# Patient Record
Sex: FEMALE | Race: WHITE | Employment: PART TIME | URBAN - METROPOLITAN AREA
[De-identification: names, ages, dates, MRNs, and addresses within clinical notes are randomized per-mention and may not be internally consistent; named-entity substitution may affect disease eponyms.]

---

## 2021-12-02 ENCOUNTER — HOSPITAL ENCOUNTER (EMERGENCY)
Age: 21
Discharge: HOME | End: 2021-12-02
Attending: EMERGENCY MEDICINE
Payer: COMMERCIAL

## 2021-12-02 VITALS
DIASTOLIC BLOOD PRESSURE: 86 MMHG | SYSTOLIC BLOOD PRESSURE: 121 MMHG | HEART RATE: 93 BPM | TEMPERATURE: 97.1 F | WEIGHT: 130 LBS | OXYGEN SATURATION: 98 % | BODY MASS INDEX: 23.04 KG/M2 | RESPIRATION RATE: 16 BRPM | HEIGHT: 63 IN

## 2021-12-02 DIAGNOSIS — R53.1 GENERALIZED WEAKNESS: Primary | ICD-10-CM

## 2021-12-02 LAB — SARS-COV-2, NAAT: NOT DETECTED

## 2021-12-02 PROCEDURE — 99284 EMERGENCY DEPT VISIT MOD MDM: CPT

## 2021-12-02 PROCEDURE — 87635 SARS-COV-2 COVID-19 AMP PRB: CPT

## 2021-12-02 ASSESSMENT — ENCOUNTER SYMPTOMS
COUGH: 1
SORE THROAT: 0
SHORTNESS OF BREATH: 0
BACK PAIN: 0
RHINORRHEA: 1
DIARRHEA: 0
VOMITING: 0
NAUSEA: 0
ABDOMINAL PAIN: 0

## 2021-12-02 NOTE — ED PROVIDER NOTES
[address] [facility] ED  eMERGENCYdEPARTMENT eNCOUnter      Pt Name: Josue Carrasquillo  MRN: [id number]  [place] 2000  Date of evaluation: 12/2/2021  Jose Cruz Camacho MD    CHIEF COMPLAINT           HPI  Josue Carrasquillo is a 22 y.o. female per chart review has no pmh presents to the ED with weakness, rhinorrhea, cough. Mother notes she was exposed to someone with covid on Sunday. Pt notes gradual onset, mild, constant, diffuse weakness since Monday. +Rhinorrhea. +Cough. Pt denies fever, n/v, cp, sob, ab pain, dysuria, diarrhea. ROS  Review of Systems   Constitutional: Negative for activity change, chills and fever. HENT: Positive for rhinorrhea. Negative for ear pain and sore throat. Eyes: Negative for visual disturbance. Respiratory: Positive for cough. Negative for shortness of breath. Cardiovascular: Negative for chest pain, palpitations and leg swelling. Gastrointestinal: Negative for abdominal pain, diarrhea, nausea and vomiting. Genitourinary: Negative for dysuria. Musculoskeletal: Negative for back pain. Skin: Negative for rash. Neurological: Positive for weakness. Negative for dizziness. Except as noted above the remainder of the review of systems was reviewed and negative. PAST MEDICAL HISTORY   History reviewed. No pertinent past medical history. SURGICAL HISTORY     History reviewed. No pertinent surgical history. CURRENTMEDICATIONS       There are no discharge medications for this patient. ALLERGIES     Patient has no known allergies. FAMILY HISTORY     History reviewed. No pertinent family history.        SOCIAL HISTORY       Social History     Socioeconomic History    Marital status:      Spouse name: None    Number of children: None    Years of education: None    Highest education level: None   Occupational History    None   Tobacco Use    Smoking status: Never Smoker    Smokeless tobacco: Never Used   Substance and Sexual Activity    Alcohol use: Not Currently     Comment: socially    Drug use: Never    Sexual activity: None   Other Topics Concern    None   Social History Narrative    None     Social Determinants of Health     Financial Resource Strain:     Difficulty of Paying Living Expenses: Not on file   Food Insecurity:     Worried About Running Out of Food in the Last Year: Not on file    Heri of Food in the Last Year: Not on file   Transportation Needs:     Lack of Transportation (Medical): Not on file    Lack of Transportation (Non-Medical): Not on file   Physical Activity:     Days of Exercise per Week: Not on file    Minutes of Exercise per Session: Not on file   Stress:     Feeling of Stress : Not on file   Social Connections:     Frequency of Communication with Friends and Family: Not on file    Frequency of Social Gatherings with Friends and Family: Not on file    Attends Catholic Services: Not on file    Active Member of [address] [place] [address] [employer] or Organizations: Not on file    Attends Club or Organization Meetings: Not on file    Marital Status: Not on file   Intimate Partner Violence:     Fear of Current or Ex-Partner: Not on file    Emotionally Abused: Not on file    Physically Abused: Not on file    Sexually Abused: Not on file   Housing Stability:     Unable to Pay for Housing in the Last Year: Not on file    Number of [place] in the Last Year: Not on file    Unstable Housing in the Last Year: Not on file         PHYSICAL EXAM       ED Triage Vitals [12/02/21 1145]   BP Temp Temp Source Pulse Resp SpO2 Height Weight   121/86 97.1 °F (36.2 °C) Temporal 93 16 98 % 5' 3\" (1.6 m) 130 lb (59 kg)       Physical Exam  Vitals and nursing note reviewed. Constitutional:       Appearance: She is well-developed. HENT:      Head: Normocephalic.       Right Ear: External ear normal.      Left Ear: External ear normal.   Eyes:      Conjunctiva/sclera: Conjunctivae normal.      Pupils: Pupils are equal, round, and reactive to light. Cardiovascular:      Rate and Rhythm: Normal rate and regular rhythm. Heart sounds: Normal heart sounds. Pulmonary:      Effort: Pulmonary effort is normal.      Breath sounds: Normal breath sounds. Abdominal:      General: Bowel sounds are normal. There is no distension. Palpations: Abdomen is soft. Tenderness: There is no abdominal tenderness. Musculoskeletal:         General: Normal range of motion. Cervical back: Normal range of motion and neck supple. Skin:     General: Skin is warm and dry. Neurological:      Mental Status: She is alert and oriented to person, place, and time. Psychiatric:         Mood and Affect: Mood normal.           MDM  23 yo female presents to the ED with rhinorrhea, cough, weakness after exposure to covid. Pt is afebrile, hemodynamically stable. Covid test sent. Covid negative. Pt educated about the results. Pt given weakness warning signs and will f/u with pcp. Pt understands plan. FINAL IMPRESSION      1.  Generalized weakness          DISPOSITION/PLAN   DISPOSITION Decision To Discharge 12/02/2021 11:59:35 AM        DISCHARGE MEDICATIONS:  [unfilled]         Maura Weller MD(electronically signed)  Attending Emergency Physician            Maura Weller MD  12/02/21 [phone]

## 2023-04-28 ENCOUNTER — OFFICE VISIT (OUTPATIENT)
Dept: PRIMARY CARE | Facility: CLINIC | Age: 23
End: 2023-04-28
Payer: COMMERCIAL

## 2023-04-28 VITALS
WEIGHT: 126 LBS | RESPIRATION RATE: 14 BRPM | BODY MASS INDEX: 22.32 KG/M2 | TEMPERATURE: 97.7 F | HEIGHT: 63 IN | HEART RATE: 102 BPM | DIASTOLIC BLOOD PRESSURE: 83 MMHG | SYSTOLIC BLOOD PRESSURE: 122 MMHG

## 2023-04-28 DIAGNOSIS — R59.0 REACTIVE CERVICAL NODES: Primary | ICD-10-CM

## 2023-04-28 DIAGNOSIS — D23.9 DERMATOFIBROMA: ICD-10-CM

## 2023-04-28 DIAGNOSIS — Z11.3 ROUTINE SCREENING FOR STI (SEXUALLY TRANSMITTED INFECTION): ICD-10-CM

## 2023-04-28 DIAGNOSIS — N94.6 DYSMENORRHEA: ICD-10-CM

## 2023-04-28 PROCEDURE — 87591 N.GONORRHOEAE DNA AMP PROB: CPT

## 2023-04-28 PROCEDURE — 87491 CHLMYD TRACH DNA AMP PROBE: CPT

## 2023-04-28 PROCEDURE — 99213 OFFICE O/P EST LOW 20 MIN: CPT | Performed by: FAMILY MEDICINE

## 2023-04-28 PROCEDURE — 1036F TOBACCO NON-USER: CPT | Performed by: FAMILY MEDICINE

## 2023-04-28 RX ORDER — LEVONORGESTREL 52 MG/1
1 INTRAUTERINE DEVICE INTRAUTERINE ONCE
COMMUNITY
Start: 2021-07-28 | End: 2023-04-28 | Stop reason: SINTOL

## 2023-04-28 NOTE — PROGRESS NOTES
Subjective   Dotty Bojorquez is a 23 y.o. female who presents for Mass.  HPI  She reports a painless, firm lump on her left upper arm for almost one year and a second one on the right side of her neck that she noticed about one month ago.  Review of Systems  Visit Vitals  /83   Pulse 102   Temp 36.5 °C (97.7 °F)   Resp 14      Objective   Physical Exam  Constitutional:       Appearance: Normal appearance.   HENT:      Head: Normocephalic.   Pulmonary:      Effort: Pulmonary effort is normal.   Musculoskeletal:      Cervical back: Neck supple.      Right lower leg: No edema.      Left lower leg: No edema.   Skin:     General: Skin is warm and dry.      Comments: There is a small 2 mm rubbery nodule that is freely mobile in the right superior posterior lymph chain.    There is a firm fibrotic 4 mm subcutaneous nodule on the left outer upper arm.  It dimples slightly when pinched but has no pigment change or erythema or induration or fluctuance   Psychiatric:         Mood and Affect: Mood normal.         Assessment/Plan    Problem List Items Addressed This Visit    None  Visit Diagnoses       Reactive cervical nodes    -  Primary    Dermatofibroma        Relevant Orders    Referral to Dermatology    Dysmenorrhea        Routine screening for STI (sexually transmitted infection)        Relevant Orders    C. Trachomatis / N. Gonorrhoeae, Amplified Detection        The nodule on the right lateral neck is most certainly a's very small mobile reactive node.  I reassured her extensively that this is highly likely to resolve on its own over time and does not require any intervention.    The nodule on the left upper outer arm is fibrotic, in the dermis layer, and dimples when pinched making it likely a dermatofibroma.  Explained that this is a benign lesion but is not likely to resolve over time.  She would like to consider removal options and I think that dermatology would give her the best advice and cosmetic options  for this.    She is also requesting screening for STI which we will do.  I offered her blood and urine screening and she is opting for the urine screening today.    Since removing her IUD 3 months ago she has had frequent menstrual bleeding every 2 to 3 weeks.  I offered her options but she would not like to start back on birth control.  It is likely that this will spontaneously resolve over time.  She will follow-up with her gynecologist if not

## 2023-04-29 LAB
CHLAMYDIA TRACH., AMPLIFIED: NEGATIVE
N. GONORRHEA, AMPLIFIED: NEGATIVE

## 2023-10-24 ENCOUNTER — OFFICE VISIT (OUTPATIENT)
Dept: URGENT CARE | Facility: CLINIC | Age: 23
End: 2023-10-24
Payer: COMMERCIAL

## 2023-10-24 VITALS
BODY MASS INDEX: 23.03 KG/M2 | DIASTOLIC BLOOD PRESSURE: 89 MMHG | HEART RATE: 84 BPM | TEMPERATURE: 98.4 F | SYSTOLIC BLOOD PRESSURE: 137 MMHG | WEIGHT: 130 LBS | OXYGEN SATURATION: 99 % | RESPIRATION RATE: 18 BRPM

## 2023-10-24 DIAGNOSIS — L03.211 CELLULITIS OF FACE: Primary | ICD-10-CM

## 2023-10-24 PROCEDURE — 1036F TOBACCO NON-USER: CPT | Performed by: PHYSICIAN ASSISTANT

## 2023-10-24 PROCEDURE — 99203 OFFICE O/P NEW LOW 30 MIN: CPT | Performed by: PHYSICIAN ASSISTANT

## 2023-10-24 RX ORDER — CLINDAMYCIN HYDROCHLORIDE 300 MG/1
300 CAPSULE ORAL 3 TIMES DAILY
Qty: 30 CAPSULE | Refills: 0 | Status: SHIPPED | OUTPATIENT
Start: 2023-10-24 | End: 2023-11-03

## 2023-10-24 ASSESSMENT — ENCOUNTER SYMPTOMS: WOUND: 1

## 2023-10-24 ASSESSMENT — PAIN SCALES - GENERAL: PAINLEVEL: 9
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
## 2024-01-22 ENCOUNTER — TELEPHONE (OUTPATIENT)
Dept: OBSTETRICS AND GYNECOLOGY | Facility: CLINIC | Age: 24
End: 2024-01-22

## 2024-01-24 ENCOUNTER — APPOINTMENT (OUTPATIENT)
Dept: OBSTETRICS AND GYNECOLOGY | Facility: CLINIC | Age: 24
End: 2024-01-24
Payer: COMMERCIAL

## 2024-01-24 ENCOUNTER — INITIAL PRENATAL (OUTPATIENT)
Dept: OBSTETRICS AND GYNECOLOGY | Facility: CLINIC | Age: 24
End: 2024-01-24
Payer: COMMERCIAL

## 2024-01-24 VITALS — WEIGHT: 132.5 LBS | DIASTOLIC BLOOD PRESSURE: 76 MMHG | BODY MASS INDEX: 23.47 KG/M2 | SYSTOLIC BLOOD PRESSURE: 108 MMHG

## 2024-01-24 DIAGNOSIS — O99.340 DEPRESSION AFFECTING PREGNANCY (HHS-HCC): ICD-10-CM

## 2024-01-24 DIAGNOSIS — Z98.82 BREAST IMPLANT STATUS: ICD-10-CM

## 2024-01-24 DIAGNOSIS — Z34.91 PRENATAL CARE IN FIRST TRIMESTER (HHS-HCC): Primary | ICD-10-CM

## 2024-01-24 DIAGNOSIS — O21.9 NAUSEA AND VOMITING IN PREGNANCY PRIOR TO 22 WEEKS GESTATION (HHS-HCC): ICD-10-CM

## 2024-01-24 DIAGNOSIS — R22.32 LUMP OF SKIN OF LEFT UPPER EXTREMITY: ICD-10-CM

## 2024-01-24 DIAGNOSIS — F32.A DEPRESSION AFFECTING PREGNANCY (HHS-HCC): ICD-10-CM

## 2024-01-24 PROBLEM — L03.211 CELLULITIS OF FACE: Status: RESOLVED | Noted: 2023-10-24 | Resolved: 2024-01-24

## 2024-01-24 PROBLEM — Z34.80 SUPERVISION OF OTHER NORMAL PREGNANCY, ANTEPARTUM (HHS-HCC): Status: ACTIVE | Noted: 2024-01-24

## 2024-01-24 LAB — PREGNANCY TEST URINE, POC: POSITIVE

## 2024-01-24 PROCEDURE — 87800 DETECT AGNT MULT DNA DIREC: CPT

## 2024-01-24 PROCEDURE — 87086 URINE CULTURE/COLONY COUNT: CPT

## 2024-01-24 PROCEDURE — H1000 PRENATAL CARE ATRISK ASSESSM: HCPCS

## 2024-01-24 PROCEDURE — 81025 URINE PREGNANCY TEST: CPT

## 2024-01-24 PROCEDURE — 99214 OFFICE O/P EST MOD 30 MIN: CPT

## 2024-01-24 RX ORDER — ONDANSETRON 4 MG/1
4 TABLET, FILM COATED ORAL EVERY 8 HOURS PRN
Qty: 30 TABLET | Refills: 2 | Status: SHIPPED | OUTPATIENT
Start: 2024-01-24

## 2024-01-24 RX ORDER — SERTRALINE HYDROCHLORIDE 50 MG/1
25 TABLET, FILM COATED ORAL DAILY
Qty: 90 TABLET | Refills: 1 | Status: SHIPPED | OUTPATIENT
Start: 2024-01-24 | End: 2024-02-22 | Stop reason: ALTCHOICE

## 2024-01-24 ASSESSMENT — PATIENT HEALTH QUESTIONNAIRE - PHQ9
10. IF YOU CHECKED OFF ANY PROBLEMS, HOW DIFFICULT HAVE THESE PROBLEMS MADE IT FOR YOU TO DO YOUR WORK, TAKE CARE OF THINGS AT HOME, OR GET ALONG WITH OTHER PEOPLE: VERY DIFFICULT
SUM OF ALL RESPONSES TO PHQ QUESTIONS 1-9: 19
3. TROUBLE FALLING OR STAYING ASLEEP OR SLEEPING TOO MUCH: MORE THAN HALF THE DAYS
9. THOUGHTS THAT YOU WOULD BE BETTER OFF DEAD, OR OF HURTING YOURSELF: NOT AT ALL
SUM OF ALL RESPONSES TO PHQ9 QUESTIONS 1 AND 2: 0
1. LITTLE INTEREST OR PLEASURE IN DOING THINGS: NEARLY EVERY DAY
4. FEELING TIRED OR HAVING LITTLE ENERGY: NEARLY EVERY DAY
2. FEELING DOWN, DEPRESSED OR HOPELESS: MORE THAN HALF THE DAYS
8. MOVING OR SPEAKING SO SLOWLY THAT OTHER PEOPLE COULD HAVE NOTICED. OR THE OPPOSITE, BEING SO FIGETY OR RESTLESS THAT YOU HAVE BEEN MOVING AROUND A LOT MORE THAN USUAL: MORE THAN HALF THE DAYS
1. LITTLE INTEREST OR PLEASURE IN DOING THINGS: NOT AT ALL
SUM OF ALL RESPONSES TO PHQ9 QUESTIONS 1 AND 2: 5
7. TROUBLE CONCENTRATING ON THINGS, SUCH AS READING THE NEWSPAPER OR WATCHING TELEVISION: MORE THAN HALF THE DAYS
2. FEELING DOWN, DEPRESSED OR HOPELESS: NOT AT ALL
5. POOR APPETITE OR OVEREATING: NEARLY EVERY DAY
6. FEELING BAD ABOUT YOURSELF - OR THAT YOU ARE A FAILURE OR HAVE LET YOURSELF OR YOUR FAMILY DOWN: MORE THAN HALF THE DAYS

## 2024-01-24 ASSESSMENT — EDINBURGH POSTNATAL DEPRESSION SCALE (EPDS)
THE THOUGHT OF HARMING MYSELF HAS OCCURRED TO ME: NEVER
I HAVE BEEN ABLE TO LAUGH AND SEE THE FUNNY SIDE OF THINGS: NOT QUITE SO MUCH NOW
I HAVE FELT SCARED OR PANICKY FOR NO GOOD REASON: YES, SOMETIMES
I HAVE BLAMED MYSELF UNNECESSARILY WHEN THINGS WENT WRONG: NOT VERY OFTEN
THINGS HAVE BEEN GETTING ON TOP OF ME: YES, SOMETIMES I HAVEN'T BEEN COPING AS WELL AS USUAL
I HAVE BEEN SO UNHAPPY THAT I HAVE HAD DIFFICULTY SLEEPING: YES, SOMETIMES
I HAVE FELT SAD OR MISERABLE: YES, QUITE OFTEN
I HAVE BEEN ANXIOUS OR WORRIED FOR NO GOOD REASON: YES, SOMETIMES
TOTAL SCORE: 15
I HAVE BEEN SO UNHAPPY THAT I HAVE BEEN CRYING: ONLY OCCASIONALLY
I HAVE LOOKED FORWARD WITH ENJOYMENT TO THINGS: DEFINITELY LESS THAN I USED TO

## 2024-01-24 ASSESSMENT — ENCOUNTER SYMPTOMS
LOSS OF SENSATION IN FEET: 0
OCCASIONAL FEELINGS OF UNSTEADINESS: 0
DEPRESSION: 0

## 2024-01-24 NOTE — PROGRESS NOTES
"Dotty Bojorquez is a 23 y.o.  at 10w1d with a working estimated date of delivery of 2024, by Last Menstrual Period who presents for an initial prenatal visit. This pregnancy is planned.    Patient's last menstrual period was 2023.     Patient currently experiencing:  Nausea/vomiting; has tried may with no relief.  Insomnia; wants to take Melatonin supplement.  Requesting Zofran Rx for nausea.  Also having constipation; has not yet tried anything to relieve.   Advised use of Colace 100 mg BID.     Bleeding or cramping since LMP: no  Taking prenatal vitamin: Yes  Ultrasound completed this pregnancy: Yes - Patient states through outside resource at ~7 weeks.      OB History    Para Term  AB Living   4 1 1 0 2 1   SAB IAB Ectopic Multiple Live Births   1 1 0 0 1      # Outcome Date GA Lbr Jeff/2nd Weight Sex Delivery Anes PTL Lv   4 Current            3 Term 17 39w0d   F Vag-Spont EPI  FLIP   2 IAB            1 SAB              [place]  Depression Scale Total: 15  Prior pregnancy complications: Gestational diabetes, diet-controlled in prior pregnancy.    History of hypertension:  No  Preeclampsia Risk - ASA prophylaxis; pt does not meet criteria.     Past Medical History:   Diagnosis Date    Abnormal Pap smear of cervix     Chlamydia     Excessive and frequent menstruation with regular cycle 2022    Spotting    H/O breast augmentation     Personal history of other infectious and parasitic diseases 10/18/2019    History of viral infection    Seasonal allergies     Urogenital trichomoniasis         Last pap: 3/2022, [facility].  Hx Depression/Anxiety - Yes - EPDS 15 today; states that she had PPD after her daughter, though \"didn't tell anyone abut it.\"  Feels that her emotions are heightened lately.  Does not see therapist or counselor.  Denies any thoughts of self-harm or harming others.  Open to beginning anti-anxiety medication and seeing new therapist.  Referral " to [facility] placed in system; low dose of Zoloft sent to patient pharmacy, advised discontinuation if any thoughts of self-harm or harming others or worsening depression.      Past Surgical History:   Procedure Laterality Date    OTHER SURGICAL HISTORY  08/20/2019    Tonsillectomy with adenoidectomy    [place] BREAST AUGMENTATION WITH IMPLANT  Spring 2021      Patient states that she had breast augmentation in 2021 in [place].  States that the implant in the left breast adhered to the muscle, and the provider was supposed to repair it, though then lost her medical license, so she has not had this repaired yet.  Wondering if/how it would affect breastfeeding?  Also concerned for a lump when she squeezes the implant that her boyfriend felt.  Lactation resources provided to patient; will place breast health  referral/US.     Patient also concerned for a skin lump on her left bicep that she states has been present x 1 year.  Dermatology referral placed and patient advised to schedule.     Social History     Tobacco Use    Smoking status: Never    Smokeless tobacco: Never   Vaping Use    Vaping Use: Every day    Substances: Nicotine   Substance Use Topics    Alcohol use: Not Currently    Drug use: Never      Employment: Stays at home.    Routine PNC, patient appropriate for and desires midwifery service. Oriented to practice, including available collaboration and consulting with physicians.   Discussed routine OB labs, including serum STI/HIV, CBC, blood type and screen.    Pregravid BMI: 22.15  Expected Total Weight Gain: 11.5 kg (25 lb)-16 kg (35 lb)     Education provided r/t nutrition, folic acid supplementation, dietary guidelines, exercise, smoking, alcohol, caffeine, and drug use.      Current Outpatient Medications:     prenatal19-iron-folic-omega3 29-1-400 mg combo pack,tablet and cap,, Take by mouth., Disp: , Rfl:      Genetic Testing - The patient was counselled regarding prenatal genetic testing options and  was provided literature in the obstetric folder. First line screening options, including cfDNA and first trimester ultrasound for nuchal translucency, were discussed and offered.  Benefits, drawbacks, and limitations were explained respectively.  It was clearly stated that only diagnostic testing (amniocentesis) provides definitive information regarding a genetic diagnosis in the fetus. It was discussed with the patient that the false positive rates for NIPT is higher for women under 35 years of age. The patient was informed that the cost of NIPT ranges and may not be covered by insurance depending on patient's age and risk factors. Patient aware that gender testing is available at a fraction of the cost through [employer].  This patient has decided to pursue.    Warning s/s discussed and SAB precautions reviewed.    F/U in 4 weeks -- Review U/S, needs new OB labs/NIPT, PE and pap.  Did she schedule with [facility] Deutsch?  Is she Covid/flu vaccinated?     Zuleyma Franklin, OLIVIA-MONI

## 2024-01-25 LAB
BACTERIA UR CULT: NO GROWTH
C TRACH RRNA SPEC QL NAA+PROBE: NEGATIVE
N GONORRHOEA DNA SPEC QL PROBE+SIG AMP: NEGATIVE

## 2024-02-13 ENCOUNTER — HOSPITAL ENCOUNTER (OUTPATIENT)
Dept: RADIOLOGY | Facility: CLINIC | Age: 24
Discharge: HOME | End: 2024-02-13
Payer: COMMERCIAL

## 2024-02-13 DIAGNOSIS — Z34.91 PRENATAL CARE IN FIRST TRIMESTER (HHS-HCC): ICD-10-CM

## 2024-02-13 PROCEDURE — 76813 OB US NUCHAL MEAS 1 GEST: CPT | Performed by: MEDICAL GENETICS

## 2024-02-13 PROCEDURE — 76813 OB US NUCHAL MEAS 1 GEST: CPT

## 2024-02-22 ENCOUNTER — LAB (OUTPATIENT)
Dept: LAB | Facility: LAB | Age: 24
End: 2024-02-22
Payer: COMMERCIAL

## 2024-02-22 ENCOUNTER — ROUTINE PRENATAL (OUTPATIENT)
Dept: OBSTETRICS AND GYNECOLOGY | Facility: CLINIC | Age: 24
End: 2024-02-22
Payer: COMMERCIAL

## 2024-02-22 VITALS — SYSTOLIC BLOOD PRESSURE: 104 MMHG | WEIGHT: 134.8 LBS | DIASTOLIC BLOOD PRESSURE: 60 MMHG | BODY MASS INDEX: 23.88 KG/M2

## 2024-02-22 DIAGNOSIS — Z34.80 SUPERVISION OF OTHER NORMAL PREGNANCY, ANTEPARTUM (HHS-HCC): Primary | ICD-10-CM

## 2024-02-22 DIAGNOSIS — F32.A DEPRESSION AFFECTING PREGNANCY (HHS-HCC): ICD-10-CM

## 2024-02-22 DIAGNOSIS — Z34.91 PRENATAL CARE IN FIRST TRIMESTER (HHS-HCC): ICD-10-CM

## 2024-02-22 DIAGNOSIS — Z34.80 SUPERVISION OF OTHER NORMAL PREGNANCY, ANTEPARTUM (HHS-HCC): ICD-10-CM

## 2024-02-22 DIAGNOSIS — O99.340 DEPRESSION AFFECTING PREGNANCY (HHS-HCC): ICD-10-CM

## 2024-02-22 LAB
ABO GROUP (TYPE) IN BLOOD: NORMAL
ANTIBODY SCREEN: NORMAL
ERYTHROCYTE [DISTWIDTH] IN BLOOD BY AUTOMATED COUNT: 12.7 % (ref 11.5–14.5)
EST. AVERAGE GLUCOSE BLD GHB EST-MCNC: 80 MG/DL
HBA1C MFR BLD: 4.4 %
HBV SURFACE AG SERPL QL IA: NONREACTIVE
HCT VFR BLD AUTO: 39.4 % (ref 36–46)
HCV AB SER QL: NONREACTIVE
HGB BLD-MCNC: 13.5 G/DL (ref 12–16)
HIV 1+2 AB+HIV1 P24 AG SERPL QL IA: NONREACTIVE
MCH RBC QN AUTO: 30.8 PG (ref 26–34)
MCHC RBC AUTO-ENTMCNC: 34.3 G/DL (ref 32–36)
MCV RBC AUTO: 90 FL (ref 80–100)
NRBC BLD-RTO: 0 /100 WBCS (ref 0–0)
PLATELET # BLD AUTO: 256 X10*3/UL (ref 150–450)
RBC # BLD AUTO: 4.39 X10*6/UL (ref 4–5.2)
REFLEX ADDED, ANEMIA PANEL: NORMAL
RH FACTOR (ANTIGEN D): NORMAL
RUBV IGG SERPL IA-ACNC: 2.4 IA
RUBV IGG SERPL QL IA: POSITIVE
TREPONEMA PALLIDUM IGG+IGM AB [PRESENCE] IN SERUM OR PLASMA BY IMMUNOASSAY: NONREACTIVE
WBC # BLD AUTO: 11.8 X10*3/UL (ref 4.4–11.3)

## 2024-02-22 PROCEDURE — 85027 COMPLETE CBC AUTOMATED: CPT

## 2024-02-22 PROCEDURE — 86901 BLOOD TYPING SEROLOGIC RH(D): CPT

## 2024-02-22 PROCEDURE — 86317 IMMUNOASSAY INFECTIOUS AGENT: CPT

## 2024-02-22 PROCEDURE — 83036 HEMOGLOBIN GLYCOSYLATED A1C: CPT

## 2024-02-22 PROCEDURE — 86803 HEPATITIS C AB TEST: CPT

## 2024-02-22 PROCEDURE — 86900 BLOOD TYPING SEROLOGIC ABO: CPT

## 2024-02-22 PROCEDURE — 86850 RBC ANTIBODY SCREEN: CPT

## 2024-02-22 PROCEDURE — 86780 TREPONEMA PALLIDUM: CPT

## 2024-02-22 PROCEDURE — 99214 OFFICE O/P EST MOD 30 MIN: CPT

## 2024-02-22 PROCEDURE — 87389 HIV-1 AG W/HIV-1&-2 AB AG IA: CPT

## 2024-02-22 PROCEDURE — 36415 COLL VENOUS BLD VENIPUNCTURE: CPT

## 2024-02-22 PROCEDURE — 87340 HEPATITIS B SURFACE AG IA: CPT

## 2024-02-22 RX ORDER — SERTRALINE HYDROCHLORIDE 50 MG/1
50 TABLET, FILM COATED ORAL DAILY
Qty: 30 TABLET | Refills: 11 | Status: SHIPPED | OUTPATIENT
Start: 2024-02-22 | End: 2025-02-21

## 2024-02-22 NOTE — LETTER
To Whom It May Concern:    This letter is to certify that, Dotty Bojorquez is a patient here at [facility].  She is currently pregnant with an estimated due date of 08/20/2024    Safe antibiotic should be given if indicated including Penicillin, Cephalosporin and Erythromycin.  Tetracyclin and Sulfa drugs should NOT be given.  Local anesthesia without epinephrine is acceptable.  DO NOT use general anesthesia.  Always shield the abdomen for X-Rays.  Painkillers like Tylenol #3 are safe, but NSAIDS should be avoided.    Patients allergies: Doxycycline. Methylphenidate and Zonisamide    If you have any questions, issues or concerns, please contact the office at [phone].    Thank you.          Zuleyma Claudio CNM

## 2024-02-22 NOTE — PROGRESS NOTES
"Liudmila Bojorquez is a 24 y.o.  at 14w2d with a working estimated date of delivery of 2024, by Last Menstrual Period who presents for a routine prenatal visit.    Patient reports overall feeling well; no concerns or OB complaints.  Taking Zoloft 25 mg daily with little relief of symptoms, wants to increase dose.  Has not yet scheduled with [facility].  States that she has a voicemail to schedule with breast speciality, though she has not done so.  Has also not yet scheduled with dermatology.    States unsure on if she is going to schedule these appointments.    Strong recommendation and support for Covid vaccine discussed. Patient aware of increased rate of hospitalization, intubation, and death with Covid in pregnancy - Demonstrated safety of vaccination during pregnancy with no associated increases in GDM, hypertensive disorders, miscarriage/ labor nor fetal anomalies reviewed. Patient aware vaccination is recommended by ACNM, ACOG, SMFM, and CDC - She declines vaccination.    Declines flu vaccine.     Did sneakpeak, it's a boy!     Objective   Visit Vitals  /60   Wt 61.1 kg (134 lb 12.8 oz)   LMP 2023   BMI 23.88 kg/m²   OB Status Pregnant   Smoking Status Never   BSA 1.65 m²     Vitals:    24 1136   Weight: 61.1 kg (134 lb 12.8 oz)      Pregravid Weight/BMI - 56.7 kg (125 lb) / 22.15  Expected Total Weight Gain - 11.5 kg (25 lb)-16 kg (35 lb)  TW.445 kg (9 lb 12.8 oz)  Fundal height and FHR per prenatal flowsheet.    Assessment/Plan   OB labs today plus A1C (hx of GDM in prior preg) and NIPT.  Wants to defer pap until her PP visit.   Anatomy US is scheduled.   Rx for 50 mg Zoloft sent; advised to take daily, to discontinue if any thoughts of self-harm or harming others emerge.   Encouraged scheduling with [facility], and utilizing previous referrals for breast specialty and dermatology (see initial prenatal note --   \"Patient states that she had breast " "augmentation in 2021 in [place].  States that the implant in the left breast adhered to the muscle, and the provider was supposed to repair it, though then lost her medical license, so she has not had this repaired yet.  Wondering if/how it would affect breastfeeding?  Also concerned for a lump when she squeezes the implant that her boyfriend felt.  Lactation resources provided to patient; will place breast health  referral/US.      Patient also concerned for a skin lump on her left bicep that she states has been present x 1 year.  Dermatology referral placed and patient advised to schedule.\" )     Warning signs and symptoms reviewed; patient has emergency answering service phone line number and is aware of when to call.   Remainder of plan per problem list as below.     Patient reports understanding; all questions answered at this time.     Medical Problems       Problem List       Supervision of other normal pregnancy, antepartum    Overview Addendum 1/24/2024 11:55 AM by DAPHNEY Gallardo     Covid/flu vaccinated?  Hx of breast augmentation 2021 -- States that the implant in the left breast adhered to the muscle, and the provider was supposed to repair it, though then lost her medical license, so she has not had this repaired yet... Referral to breast surgery and specialty placed.          Depression affecting pregnancy    Overview Addendum 1/24/2024 11:00 AM by DAPHNEY Gallardo     EPDS 15 on 1/24/24.  Referral placed to [facility].  Zoloft Rx 25 mg sent to pharmacy 1/24/24.             F/U in 4 weeks; review new OB labs and NIPT.  Scheduled with [facility] Deutsch?    DAPHNEY Gallardo  "

## 2024-03-06 LAB — SCAN RESULT: NORMAL

## 2024-03-21 ENCOUNTER — ROUTINE PRENATAL (OUTPATIENT)
Dept: OBSTETRICS AND GYNECOLOGY | Facility: CLINIC | Age: 24
End: 2024-03-21
Payer: COMMERCIAL

## 2024-03-21 VITALS
WEIGHT: 138.13 LBS | SYSTOLIC BLOOD PRESSURE: 110 MMHG | BODY MASS INDEX: 24.47 KG/M2 | DIASTOLIC BLOOD PRESSURE: 80 MMHG

## 2024-03-21 DIAGNOSIS — F32.A DEPRESSION AFFECTING PREGNANCY (HHS-HCC): ICD-10-CM

## 2024-03-21 DIAGNOSIS — Z34.80 SUPERVISION OF OTHER NORMAL PREGNANCY, ANTEPARTUM (HHS-HCC): ICD-10-CM

## 2024-03-21 DIAGNOSIS — O99.340 DEPRESSION AFFECTING PREGNANCY (HHS-HCC): ICD-10-CM

## 2024-03-21 PROCEDURE — 99213 OFFICE O/P EST LOW 20 MIN: CPT

## 2024-03-21 NOTE — PROGRESS NOTES
Subjective   Dotty Bojorquez is a 24 y.o.  at 18w2d with a working estimated date of delivery of 2024, by Last Menstrual Period who presents for a routine prenatal visit.    Patient reports overall feeling well; no concerns or OB complaints.    Had abdominal cramping and diarrhea last week, feeling better.  Continues to have occasional, intermittent right sided abdominal cramping, though improving.    Taking Zoloft 50 mg, though states that she is forgetful and does not always remember to take it -- Thinks that if she remembers to take it daily, she would notice more of an improvement.  Denies any thoughts of self-harm or harming others.  Did not yet schedule with [facility] -- States that she didn't know if she had an office location closer to her house, though does plan to call to schedule.      Objective   Visit Vitals  /80   Wt 62.7 kg (138 lb 2 oz)   LMP 2023   BMI 24.47 kg/m²   OB Status Pregnant   Smoking Status Never   BSA 1.67 m²     Vitals:    24 1124   Weight: 62.7 kg (138 lb 2 oz)      Pregravid Weight/BMI - 56.7 kg (125 lb) / 22.15  Expected Total Weight Gain - 11.5 kg (25 lb)-16 kg (35 lb)  TW.953 kg (13 lb 2 oz)  Fundal height and FHR per prenatal flowsheet.    Assessment/Plan   Anatomy US is scheduled.   Reviewed new OB labs and NIPT -- Blood type A neg, will plan for Rhogam at 28 weeks.  Advised to schedule with [facility] Clementeley and utilize previous referrals for dermatology and breast specialty as previously discussed.   Continue daily Zoloft.   Encouraged adequate hydration after having likely GI virus over the past weekend.  Comfort measures for round ligament pain reviewed.   Warning signs and symptoms reviewed; patient has emergency answering service phone line number and is aware of when to call.   Remainder of plan per problem list as below.     Medical Problems       Problem List       Supervision of other normal pregnancy, antepartum    Overview Addendum 3/6/2024   8:08 AM by DAPHNEY Gallardo     Declines Covid/flu vaccine.  Normal rr NIPS.    Hx of breast augmentation 2021 -- States that the implant in the left breast adhered to the muscle, and the provider was supposed to repair it, though then lost her medical license, so she has not had this repaired yet... Referral to breast surgery and specialty placed.     Rh NEG -- For Rhogam 28 weeks.          Depression affecting pregnancy    Overview Addendum 2/22/2024 12:52 PM by DAPHNEY Gallardo     EPDS 15 on 1/24/24.  Referral placed to [facility].  Zoloft Rx 50 mg sent to pharmacy 2/22/24.             F/U in 4 weeks; review anatomy US, order GTT/CBC.    DAPHNEY Gallardo

## 2024-03-26 ENCOUNTER — APPOINTMENT (OUTPATIENT)
Dept: RADIOLOGY | Facility: CLINIC | Age: 24
End: 2024-03-26
Payer: COMMERCIAL

## 2024-04-09 ENCOUNTER — OFFICE VISIT (OUTPATIENT)
Dept: DERMATOLOGY | Facility: CLINIC | Age: 24
End: 2024-04-09
Payer: COMMERCIAL

## 2024-04-09 DIAGNOSIS — L57.8 PHOTOAGING OF SKIN: ICD-10-CM

## 2024-04-09 DIAGNOSIS — D22.60 MELANOCYTIC NEVI OF UNSPECIFIED UPPER LIMB, INCLUDING SHOULDER: ICD-10-CM

## 2024-04-09 DIAGNOSIS — D22.5 MELANOCYTIC NEVI OF TRUNK: ICD-10-CM

## 2024-04-09 DIAGNOSIS — Z12.83 ENCOUNTER FOR SCREENING FOR MALIGNANT NEOPLASM OF SKIN: Primary | ICD-10-CM

## 2024-04-09 DIAGNOSIS — L72.0 EPITHELIAL INCLUSION CYST: ICD-10-CM

## 2024-04-09 DIAGNOSIS — D23.9 DERMATOFIBROMA: ICD-10-CM

## 2024-04-09 PROCEDURE — 99203 OFFICE O/P NEW LOW 30 MIN: CPT | Performed by: DERMATOLOGY

## 2024-04-09 PROCEDURE — 1036F TOBACCO NON-USER: CPT | Performed by: DERMATOLOGY

## 2024-04-09 ASSESSMENT — DERMATOLOGY PATIENT ASSESSMENT
DO YOU USE A TANNING BED: YES, PREVIOUSLY
DO YOU HAVE ANY NEW OR CHANGING LESIONS: YES
HAVE YOU HAD OR DO YOU HAVE A STAPH INFECTION: NO
ARE YOU AN ORGAN TRANSPLANT RECIPIENT: NO
ARE YOU TRYING TO GET PREGNANT: YES
ARE YOU ON BIRTH CONTROL: NO
HAVE YOU HAD OR DO YOU HAVE VASCULAR DISEASE: NO
DO YOU HAVE IRREGULAR MENSTRUAL CYCLES: NO

## 2024-04-09 ASSESSMENT — DERMATOLOGY QUALITY OF LIFE (QOL) ASSESSMENT
RATE HOW BOTHERED YOU ARE BY SYMPTOMS OF YOUR SKIN PROBLEM (EG, ITCHING, STINGING BURNING, HURTING OR SKIN IRRITATION): 6 - ALWAYS BOTHERED
ARE THERE EXCLUSIONS OR EXCEPTIONS FOR THE QUALITY OF LIFE ASSESSMENT: NO
RATE HOW BOTHERED YOU ARE BY EFFECTS OF YOUR SKIN PROBLEMS ON YOUR ACTIVITIES (EG, GOING OUT, ACCOMPLISHING WHAT YOU WANT, WORK ACTIVITIES OR YOUR RELATIONSHIPS WITH OTHERS): 0 - NEVER BOTHERED
WHAT SINGLE SKIN CONDITION LISTED BELOW IS THE PATIENT ANSWERING THE QUALITY-OF-LIFE ASSESSMENT QUESTIONS ABOUT: NONE OF THE ABOVE
DATE THE QUALITY-OF-LIFE ASSESSMENT WAS COMPLETED: 66939
RATE HOW EMOTIONALLY BOTHERED YOU ARE BY YOUR SKIN PROBLEM (FOR EXAMPLE, WORRY, EMBARRASSMENT, FRUSTRATION): 6 - ALWAYS BOTHERED

## 2024-04-09 ASSESSMENT — ITCH NUMERIC RATING SCALE: HOW SEVERE IS YOUR ITCHING?: 0

## 2024-04-09 ASSESSMENT — PATIENT GLOBAL ASSESSMENT (PGA): PATIENT GLOBAL ASSESSMENT: PATIENT GLOBAL ASSESSMENT:  2 - MILD

## 2024-04-09 NOTE — PROGRESS NOTES
Liudmila Bojorquez is a 24 y.o. female who presents for the following: Skin Check (Pt here for FBSE with family hx of NMSC - Mother. Pt reports area of concern on left arm and posterior neck. ).     Review of Systems:  No other skin or systemic complaints other than what is documented elsewhere in the note.    The following portions of the chart were reviewed this encounter and updated as appropriate:         Skin Cancer History  No skin cancer on file.      Specialty Problems    None       Objective   Well appearing patient in no apparent distress; mood and affect are within normal limits.    A full examination was performed including scalp, head, eyes, ears, nose, lips, neck, chest, axillae, abdomen, back, buttocks, bilateral upper extremities, bilateral lower extremities, hands, feet, fingers, toes, fingernails, and toenails. All findings within normal limits unless otherwise noted below.    Assessment/Plan   1. Encounter for screening for malignant neoplasm of skin  No suspicious lesions noted on examination today    The risk of chronic, cumulative sun damage and risk of development of skin cancer was reviewed today.   The importance of sun protection was reviewed: including the use of a broad spectrum sunscreen that protects against both UVA/UVB rays, with ingredients such as Zinc oxide or titanium dioxide, wearing sun protective clothing and sun avoidance. We reviewed the warning signs of non-melanoma skin cancer and ABCDEs of melanoma  Please follow up should you notice any new or changing pre-existing skin lesion.    2. Photoaging of skin  Mottled pigmentation with telangiectasias and brown reticular macules in sun exposed areas of the body.    The risk of chronic, cumulative sun damage and risk of development of skin cancer was reviewed today.   The importance of sun protection was reviewed: including the use of a broad spectrum sunscreen that protects against both UVA/UVB rays, with  ingredients such as Zinc oxide or titanium dioxide, wearing sun protective clothing and sun avoidance. We reviewed the warning signs of non-melanoma skin cancer and ABCDEs of melanoma  Please follow up should you notice any new or changing pre-existing skin lesion.    3. Dermatofibroma  Left Upper Arm - Anterior  Firm pink papule with hyperpigmented halo, +dimple sign    Benign, scar tissue like growth that most frequently is due to bug bite or ingrown hair. No treatment is necessary.    Patient would like removed, advised will trade for a scar and may still have discoloration following removal at site of scar.    Advised to defer removal until after she has baby (4-6 weeks at least post partum)    Related Procedures  Follow Up In Dermatology - Established Patient    4. Epithelial inclusion cyst  Right Anterior Neck  0.5 cm subcutaneous, mobile nodule with a central punctum.    Epidermal cysts are benign, encapsulated growths of unknown cause.   These lesions can become enlarged, inflamed, painful and drain.   These lesions can be removed surgically, however this procedure requires a separate appointment, local anesthesia is used and often requires both deep and superficial sutures (stitches).   Following removal the lesion will be traded for a scar.   Risks and benefits of removal include but are limited to: incomplete removal, recurrence, keloid (thickened, itchy or painful scar) formation, wound dehiscence (opening) and need to limit activity for 10-14 days following procedure.    Punch removal post-partum 4-6 weeks post partum    Related Procedures  Follow Up In Dermatology - Established Patient    5. Melanocytic nevi of trunk (2)  Left Flank, Left Lower Back  Tan-brown symmetric macules, variegated brown thin plaque on the left flank    Clinically benign appearing nevi, no treatment is necessary.  The importance of sun protection was reviewed: including the use of a broad spectrum sunscreen that protects  against both UVA/UVB rays, with ingredients such as Zinc oxide or titanium dioxide, wearing sun protective clothing and sun avoidance.   ABCDEs of melanoma reviewed.  Please follow up should you notice any new or changing pre-existing skin lesion.    6. Melanocytic nevi of unspecified upper limb, including shoulder (2)  Left Arm, Right Arm  Scattered, uniform and benign-appearing, regular brown melanocytic papules and macules.    Clinically benign appearing nevi, no treatment is necessary.  The importance of sun protection was reviewed: including the use of a broad spectrum sunscreen that protects against both UVA/UVB rays, with ingredients such as Zinc oxide or titanium dioxide, wearing sun protective clothing and sun avoidance.   ABCDEs of melanoma reviewed.  Please follow up should you notice any new or changing pre-existing skin lesion.

## 2024-04-10 ENCOUNTER — HOSPITAL ENCOUNTER (OUTPATIENT)
Dept: RADIOLOGY | Facility: CLINIC | Age: 24
Discharge: HOME | End: 2024-04-10
Payer: COMMERCIAL

## 2024-04-10 DIAGNOSIS — Z34.91 PRENATAL CARE IN FIRST TRIMESTER (HHS-HCC): ICD-10-CM

## 2024-04-10 PROCEDURE — 76805 OB US >/= 14 WKS SNGL FETUS: CPT | Performed by: OBSTETRICS & GYNECOLOGY

## 2024-04-10 PROCEDURE — 76805 OB US >/= 14 WKS SNGL FETUS: CPT

## 2024-04-18 ENCOUNTER — ROUTINE PRENATAL (OUTPATIENT)
Dept: OBSTETRICS AND GYNECOLOGY | Facility: CLINIC | Age: 24
End: 2024-04-18
Payer: COMMERCIAL

## 2024-04-18 VITALS — BODY MASS INDEX: 26.39 KG/M2 | DIASTOLIC BLOOD PRESSURE: 80 MMHG | WEIGHT: 149 LBS | SYSTOLIC BLOOD PRESSURE: 118 MMHG

## 2024-04-18 DIAGNOSIS — Z34.92 PRENATAL CARE IN SECOND TRIMESTER (HHS-HCC): Primary | ICD-10-CM

## 2024-04-18 PROCEDURE — 99213 OFFICE O/P EST LOW 20 MIN: CPT

## 2024-04-18 ASSESSMENT — EDINBURGH POSTNATAL DEPRESSION SCALE (EPDS)
I HAVE FELT SCARED OR PANICKY FOR NO GOOD REASON: YES, SOMETIMES
TOTAL SCORE: 15
THE THOUGHT OF HARMING MYSELF HAS OCCURRED TO ME: NEVER
I HAVE BEEN SO UNHAPPY THAT I HAVE HAD DIFFICULTY SLEEPING: YES, SOMETIMES
I HAVE BEEN SO UNHAPPY THAT I HAVE BEEN CRYING: YES, QUITE OFTEN
I HAVE LOOKED FORWARD WITH ENJOYMENT TO THINGS: RATHER LESS THAN I USED TO
I HAVE FELT SAD OR MISERABLE: YES, QUITE OFTEN
I HAVE BEEN ANXIOUS OR WORRIED FOR NO GOOD REASON: YES, SOMETIMES
THINGS HAVE BEEN GETTING ON TOP OF ME: YES, SOMETIMES I HAVEN'T BEEN COPING AS WELL AS USUAL
I HAVE BEEN ABLE TO LAUGH AND SEE THE FUNNY SIDE OF THINGS: NOT QUITE SO MUCH NOW
I HAVE BLAMED MYSELF UNNECESSARILY WHEN THINGS WENT WRONG: NOT VERY OFTEN

## 2024-04-18 NOTE — PROGRESS NOTES
Subjective   Dotty Bojorquez is a 24 y.o.  at 22w2d with a working estimated date of delivery of 2024, by Last Menstrual Period who presents for a routine prenatal visit.    Patient reports overall feeling well; no concerns or OB complaints.  Had visit with dermatology; plans for removal of cyst on arm postpartum.  Continues daily Zoloft when she remembers, not yet scheduled with MAHI Deutsch.  Tearful at today's appointment, states that she is having relationship troubles, though does not want to discuss it further.  Feels safe at home; no thoughts of self-harm or harming others.    Objective   Visit Vitals  /80   Wt 67.6 kg (149 lb)   LMP 2023   BMI 26.39 kg/m²   OB Status Pregnant   Smoking Status Never   BSA 1.73 m²     Vitals:    24 1134   Weight: 67.6 kg (149 lb)      Pregravid Weight/BMI - 56.7 kg (125 lb) / 22.15  Expected Total Weight Gain - 11.5 kg (25 lb)-16 kg (35 lb)  TWG: 10.9 kg (24 lb)  Fundal height and FHR per prenatal flowsheet.    Assessment/Plan   Reviewed normal anatomy US.  GTT/CBC orders placed; for completion between 24-28 weeks.  28 week folder provided today.   Again encouraged patient to schedule with MAHI Deutsch.  Warning signs and symptoms reviewed; patient has emergency answering service phone line number and is aware of when to call.   Remainder of plan per problem list as below.     Medical Problems       Problem List       Supervision of other normal pregnancy, antepartum ([facility])    Overview Addendum 3/21/2024 11:42 AM by Zuleyma Claudio, OLIVIA-MONI     Declines Covid/flu vaccine.  Normal rr NIPS, it's a BOY!    Hx of breast augmentation  -- States that the implant in the left breast adhered to the muscle, and the provider was supposed to repair it, though then lost her medical license, so she has not had this repaired yet... Referral to breast surgery and specialty placed.     Rh NEG -- For Rhogam 28 weeks.          Depression affecting pregnancy  ([facility])    Overview Addendum 2/22/2024 12:52 PM by DAPHNEY Gallardo     EPDS 15 on 1/24/24.  Referral placed to MAHI Deutsch.  Zoloft Rx 50 mg sent to pharmacy 2/22/24.             F/U in 4 weeks; review GTT/CBC.    DAPHNEY Gallardo

## 2024-05-15 ENCOUNTER — ROUTINE PRENATAL (OUTPATIENT)
Dept: OBSTETRICS AND GYNECOLOGY | Facility: CLINIC | Age: 24
End: 2024-05-15
Payer: COMMERCIAL

## 2024-05-15 VITALS — DIASTOLIC BLOOD PRESSURE: 70 MMHG | SYSTOLIC BLOOD PRESSURE: 110 MMHG | BODY MASS INDEX: 27.72 KG/M2 | WEIGHT: 156.5 LBS

## 2024-05-15 DIAGNOSIS — F32.A DEPRESSION DURING PREGNANCY IN SECOND TRIMESTER (HHS-HCC): ICD-10-CM

## 2024-05-15 DIAGNOSIS — O99.342 DEPRESSION DURING PREGNANCY IN SECOND TRIMESTER (HHS-HCC): ICD-10-CM

## 2024-05-15 DIAGNOSIS — Z34.92 PRENATAL CARE IN SECOND TRIMESTER (HHS-HCC): Primary | ICD-10-CM

## 2024-05-15 DIAGNOSIS — R35.0 URINARY FREQUENCY: ICD-10-CM

## 2024-05-15 DIAGNOSIS — Z78.9 NORMAL URINALYSIS: ICD-10-CM

## 2024-05-15 LAB
POC APPEARANCE, URINE: CLEAR
POC BILIRUBIN, URINE: NEGATIVE
POC BLOOD, URINE: NEGATIVE
POC COLOR, URINE: YELLOW
POC GLUCOSE, URINE: NEGATIVE MG/DL
POC KETONES, URINE: NEGATIVE MG/DL
POC LEUKOCYTES, URINE: NEGATIVE
POC NITRITE,URINE: NEGATIVE
POC PH, URINE: 8.5 PH
POC PROTEIN, URINE: NEGATIVE MG/DL
POC SPECIFIC GRAVITY, URINE: 1.02
POC UROBILINOGEN, URINE: 0.2 EU/DL

## 2024-05-15 PROCEDURE — 99213 OFFICE O/P EST LOW 20 MIN: CPT

## 2024-05-15 PROCEDURE — H1000 PRENATAL CARE ATRISK ASSESSM: HCPCS

## 2024-05-15 ASSESSMENT — EDINBURGH POSTNATAL DEPRESSION SCALE (EPDS)
I HAVE BEEN ANXIOUS OR WORRIED FOR NO GOOD REASON: YES, VERY OFTEN
THINGS HAVE BEEN GETTING ON TOP OF ME: YES, SOMETIMES I HAVEN'T BEEN COPING AS WELL AS USUAL
I HAVE BLAMED MYSELF UNNECESSARILY WHEN THINGS WENT WRONG: NOT VERY OFTEN
I HAVE BEEN ABLE TO LAUGH AND SEE THE FUNNY SIDE OF THINGS: NOT QUITE SO MUCH NOW
I HAVE LOOKED FORWARD WITH ENJOYMENT TO THINGS: RATHER LESS THAN I USED TO
I HAVE FELT SCARED OR PANICKY FOR NO GOOD REASON: YES, SOMETIMES
TOTAL SCORE: 15
I HAVE FELT SAD OR MISERABLE: YES, QUITE OFTEN
I HAVE BEEN SO UNHAPPY THAT I HAVE HAD DIFFICULTY SLEEPING: YES, SOMETIMES
THE THOUGHT OF HARMING MYSELF HAS OCCURRED TO ME: NEVER
I HAVE BEEN SO UNHAPPY THAT I HAVE BEEN CRYING: ONLY OCCASIONALLY

## 2024-05-15 NOTE — PROGRESS NOTES
Subjective   Dotty Bojorquez is a 24 y.o.  at 26w1d with a working estimated date of delivery of 2024, by Last Menstrual Period who presents for a routine prenatal visit.    Patient reports overall feeling well; no concerns or OB complaints.  Taking Zoloft daily, feels that it is helping her more now that she is remembering to take it every day.  States that she can't get through to anyone to schedule for psychiatry appointment with MAHI Deutsch, has left messages with no call back.   Having urinary frequency for the past 2 weeks.    Objective   Visit Vitals  /70   Wt 71 kg (156 lb 8 oz)   LMP 2023   BMI 27.72 kg/m²   OB Status Pregnant   Smoking Status Never   BSA 1.78 m²     Vitals:    05/15/24 1100   Weight: 71 kg (156 lb 8 oz)      Pregravid Weight/BMI - 56.7 kg (125 lb) / 22.15  Expected Total Weight Gain - 11.5 kg (25 lb)-16 kg (35 lb)  TW.3 kg (31 lb 8 oz)  Fundal height and FHR per prenatal flowsheet.    Assessment/Plan   GTT/CBC/ and type & screen ordered; patient plans to be drawn for these next week.  UA done today, WNL.   Another STAT referral to psychiatry placed.  Weight gain reviewed; fundal height appropriate for GA, though would consider growth US if indicated in third trimester.  Birth preferences reviewed; wants 39 week IOL.  Plans epidural and breastfeeding.   Warning signs and symptoms reviewed; patient has emergency answering service phone line number and is aware of when to call.   Remainder of plan per problem list as below.     Medical Problems       Problem List       Supervision of other normal pregnancy, antepartum ([facility])    Overview Addendum 3/21/2024 11:42 AM by Zuleyma Claudio, OLIVIA-MONI     Declines Covid/flu vaccine.  Normal rr NIPS, it's a BOY!    Hx of breast augmentation  -- States that the implant in the left breast adhered to the muscle, and the provider was supposed to repair it, though then lost her medical license, so she has not had this  repaired yet... Referral to breast surgery and specialty placed.     Rh NEG -- For Rhogam 28 weeks.          Depression affecting pregnancy ([facility])    Overview Addendum 2/22/2024 12:52 PM by DAPHNEY Gallardo     EPDS 15 on 1/24/24.  Referral placed to MAHI Deutsch.  Zoloft Rx 50 mg sent to pharmacy 2/22/24.             F/U in 2 weeks; review GTT/CBC, administer Rhogam, offer Tdap.    DAPHNEY Gallardo

## 2024-05-16 ENCOUNTER — APPOINTMENT (OUTPATIENT)
Dept: OBSTETRICS AND GYNECOLOGY | Facility: CLINIC | Age: 24
End: 2024-05-16
Payer: COMMERCIAL

## 2024-05-23 ENCOUNTER — LAB (OUTPATIENT)
Dept: LAB | Facility: LAB | Age: 24
End: 2024-05-23
Payer: COMMERCIAL

## 2024-05-23 DIAGNOSIS — Z34.92 PRENATAL CARE IN SECOND TRIMESTER (HHS-HCC): ICD-10-CM

## 2024-05-23 PROCEDURE — 36415 COLL VENOUS BLD VENIPUNCTURE: CPT

## 2024-05-23 PROCEDURE — 86901 BLOOD TYPING SEROLOGIC RH(D): CPT

## 2024-05-23 PROCEDURE — 86900 BLOOD TYPING SEROLOGIC ABO: CPT

## 2024-05-23 PROCEDURE — 86850 RBC ANTIBODY SCREEN: CPT

## 2024-05-23 PROCEDURE — 82947 ASSAY GLUCOSE BLOOD QUANT: CPT

## 2024-05-23 PROCEDURE — 85027 COMPLETE CBC AUTOMATED: CPT

## 2024-05-24 LAB
ABO GROUP (TYPE) IN BLOOD: NORMAL
ANTIBODY SCREEN: NORMAL
ERYTHROCYTE [DISTWIDTH] IN BLOOD BY AUTOMATED COUNT: 12.9 % (ref 11.5–14.5)
GLUCOSE 1H P 50 G GLC PO SERPL-MCNC: 123 MG/DL
HCT VFR BLD AUTO: 34.4 % (ref 36–46)
HGB BLD-MCNC: 11.3 G/DL (ref 12–16)
MCH RBC QN AUTO: 30.5 PG (ref 26–34)
MCHC RBC AUTO-ENTMCNC: 32.8 G/DL (ref 32–36)
MCV RBC AUTO: 93 FL (ref 80–100)
NRBC BLD-RTO: 0 /100 WBCS (ref 0–0)
PLATELET # BLD AUTO: 238 X10*3/UL (ref 150–450)
RBC # BLD AUTO: 3.7 X10*6/UL (ref 4–5.2)
REFLEX ADDED, ANEMIA PANEL: NORMAL
RH FACTOR (ANTIGEN D): NORMAL
WBC # BLD AUTO: 11.3 X10*3/UL (ref 4.4–11.3)

## 2024-05-30 ENCOUNTER — ROUTINE PRENATAL (OUTPATIENT)
Dept: OBSTETRICS AND GYNECOLOGY | Facility: CLINIC | Age: 24
End: 2024-05-30
Payer: COMMERCIAL

## 2024-05-30 VITALS — SYSTOLIC BLOOD PRESSURE: 98 MMHG | BODY MASS INDEX: 28.19 KG/M2 | WEIGHT: 159.13 LBS | DIASTOLIC BLOOD PRESSURE: 60 MMHG

## 2024-05-30 DIAGNOSIS — Z67.91 RH NEGATIVE STATUS DURING PREGNANCY IN THIRD TRIMESTER (HHS-HCC): ICD-10-CM

## 2024-05-30 DIAGNOSIS — Z23 NEED FOR TDAP VACCINATION: ICD-10-CM

## 2024-05-30 DIAGNOSIS — G89.29 CHRONIC MIDLINE LOW BACK PAIN WITH BILATERAL SCIATICA: ICD-10-CM

## 2024-05-30 DIAGNOSIS — M54.41 CHRONIC MIDLINE LOW BACK PAIN WITH BILATERAL SCIATICA: ICD-10-CM

## 2024-05-30 DIAGNOSIS — O26.893 RH NEGATIVE STATUS DURING PREGNANCY IN THIRD TRIMESTER (HHS-HCC): ICD-10-CM

## 2024-05-30 DIAGNOSIS — Z34.80 SUPERVISION OF OTHER NORMAL PREGNANCY, ANTEPARTUM (HHS-HCC): ICD-10-CM

## 2024-05-30 DIAGNOSIS — O99.340 DEPRESSION AFFECTING PREGNANCY (HHS-HCC): ICD-10-CM

## 2024-05-30 DIAGNOSIS — M54.42 CHRONIC MIDLINE LOW BACK PAIN WITH BILATERAL SCIATICA: ICD-10-CM

## 2024-05-30 DIAGNOSIS — F32.A DEPRESSION AFFECTING PREGNANCY (HHS-HCC): ICD-10-CM

## 2024-05-30 DIAGNOSIS — Z3A.28 28 WEEKS GESTATION OF PREGNANCY (HHS-HCC): Primary | ICD-10-CM

## 2024-05-30 DIAGNOSIS — Z3A.39 39 WEEKS GESTATION OF PREGNANCY (HHS-HCC): ICD-10-CM

## 2024-05-30 PROCEDURE — 96372 THER/PROPH/DIAG INJ SC/IM: CPT

## 2024-05-30 PROCEDURE — 99214 OFFICE O/P EST MOD 30 MIN: CPT

## 2024-05-30 NOTE — PROGRESS NOTES
Subjective   Dotty Bojorquez is a 24 y.o.  at 28w2d with a working estimated date of delivery of 2024, by Last Menstrual Period who presents for a routine prenatal visit.    Patient reports overall feeling well.  States that her nausea and acid reflux have been bothersome in the past week or so.  Is taking Zofran as needed and Tums with some relief.  Low back pain, interested in chiropractic care.  Scheduled with MAHI Deutsch .  Desires 39 week IOL with me, wants to be scheduled.     Objective   Visit Vitals  BP 98/60   Wt 72.2 kg (159 lb 2 oz)   LMP 2023   BMI 28.19 kg/m²   OB Status Pregnant   Smoking Status Never   BSA 1.79 m²     Vitals:    24 1133   Weight: 72.2 kg (159 lb 2 oz)      Pregravid Weight/BMI - 56.7 kg (125 lb) / 22.15  Expected Total Weight Gain - 11.5 kg (25 lb)-16 kg (35 lb)  TWG: 15.5 kg (34 lb 2 oz)  Fundal height and FHR per prenatal flowsheet.    Assessment/Plan   Reviewed 1-hr GTT and normal Hgb.  Rhogam administered today.   Wants Tdap next visit.  Referral to chiropractic care provided.  Discussed use of Tums and Pepcid 20 mg BID as needed for acid reflux.  IOL order placed to be scheduled for 8/15/24 @ 2000 and I am the midwife on call.  --- Note per MJ that it is too early to schedule induction; will schedule in early July for her IOL date.  Warning signs and symptoms reviewed; patient has emergency answering service phone line number and is aware of when to call.   Remainder of plan per problem list as below.     Medical Problems       Problem List       Supervision of other normal pregnancy, antepartum ([facility])    Overview Addendum 5/15/2024 11:44 AM by DAPHNEY Velasquez     Declines Covid/flu vaccine.  Normal rr NIPS, it's a BOY!    Hx of breast augmentation  -- States that the implant in the left breast adhered to the muscle, and the provider was supposed to repair it, though then lost her medical license, so she has not had this repaired  yet... Referral to breast surgery and specialty placed.     Rh NEG -- For Rhogam 28 weeks.     Wants 39 week IOL.  Epidural.         Depression affecting pregnancy ([facility])    Overview Addendum 2/22/2024 12:52 PM by DAPHNEY Gallardo     EPDS 15 on 1/24/24.  Referral placed to MAHI Deutsch.  Zoloft Rx 50 mg sent to pharmacy 2/22/24.             F/U in 2 weeks, Tdap.     DAPHNEY Gallardo

## 2024-06-06 ENCOUNTER — ALLIED HEALTH (OUTPATIENT)
Dept: INTEGRATIVE MEDICINE | Facility: CLINIC | Age: 24
End: 2024-06-06
Payer: COMMERCIAL

## 2024-06-06 DIAGNOSIS — M79.9 POSTURAL STRAIN: ICD-10-CM

## 2024-06-06 DIAGNOSIS — M99.03 SEGMENTAL AND SOMATIC DYSFUNCTION OF LUMBAR REGION: Primary | ICD-10-CM

## 2024-06-06 DIAGNOSIS — M54.9 BACK PAIN IN PREGNANCY (HHS-HCC): ICD-10-CM

## 2024-06-06 DIAGNOSIS — M99.05 SEGMENTAL AND SOMATIC DYSFUNCTION OF PELVIC REGION: ICD-10-CM

## 2024-06-06 DIAGNOSIS — M79.10 MYALGIA: ICD-10-CM

## 2024-06-06 DIAGNOSIS — M99.06 SEGMENTAL AND SOMATIC DYSFUNCTION OF LOWER EXTREMITY: ICD-10-CM

## 2024-06-06 DIAGNOSIS — O99.891 BACK PAIN IN PREGNANCY (HHS-HCC): ICD-10-CM

## 2024-06-06 DIAGNOSIS — M99.04 SEGMENTAL AND SOMATIC DYSFUNCTION OF SACRAL REGION: ICD-10-CM

## 2024-06-06 PROCEDURE — 98941 CHIROPRACT MANJ 3-4 REGIONS: CPT | Performed by: CHIROPRACTOR

## 2024-06-06 PROCEDURE — 99203 OFFICE O/P NEW LOW 30 MIN: CPT | Performed by: CHIROPRACTOR

## 2024-06-06 ASSESSMENT — ENCOUNTER SYMPTOMS
FLANK PAIN: 0
MYALGIAS: 1
CHILLS: 0
LIGHT-HEADEDNESS: 0
NECK STIFFNESS: 0
UNEXPECTED WEIGHT CHANGE: 0
FEVER: 0
NUMBNESS: 0
WEAKNESS: 0
TROUBLE SWALLOWING: 0
DIFFICULTY URINATING: 0
BACK PAIN: 1
ARTHRALGIAS: 0
DIZZINESS: 0
CHEST TIGHTNESS: 0
VOMITING: 0
FATIGUE: 0
HEADACHES: 0
NECK PAIN: 0
SHORTNESS OF BREATH: 0
JOINT SWELLING: 0

## 2024-06-06 NOTE — PROGRESS NOTES
Subjective   Patient ID: Dotty Bojorquez is a 24 y.o. female who presents today, June 6, 2024 for a new patient evaluation and treatment of low back pain in pregnancy.    (1/15) TERESACY  RANDY: 8/20/24    Chiropractic Medicine HPI:  Provacative factors include : lying down, sitting, and standing  Palliative factors incude : medications other: leaning forward on the bed  Pain is described as : ache dull sharp   Previous treatment for complaint has included : NSAIDS  Patient denies : difficulty walking bladder weakness bowel weakness catching clicking grinding instability numbness popping radiating pain into the distal extremity trauma  Intensity of the pain since last visit: Patient rates least severe pain at : 5/10 Patient rates most severe pain at : 9/10  Oswestry Disability Index has been completed: yes     Dotty presents for evaluation and treatment of of low back pain in pregnancy. She states that symptoms began the beginning of her second trimester without injury/trauma/accident. She states that they have worsened since then. She states that she had mild low back pain with her previous pregnancy, but these symptoms are much more severe. She states that pain goes across the low back and into the buttock. She states that she has pain that wraps around to the groin. She states that she occasionally has numbness in her legs while lying on her sides. She states that lying down, standing and sitting increase symptoms , while leaning forward and tylenol help relieve symptoms. She states that she has not had any previous treatment for these symptoms. She states that she had chiropractic care after her first pregnancy and found treatment beneficial. Patient denies any headaches, difficulty speaking/swallowing, vision changes, bowel/bladder issues, chest pain/shortness of breath.           Objective   Review of Systems   Constitutional:  Negative for chills, fatigue, fever and unexpected weight change.   HENT:  Negative  for trouble swallowing.    Eyes:  Negative for visual disturbance.   Respiratory:  Negative for chest tightness and shortness of breath.    Cardiovascular:  Negative for chest pain and leg swelling.   Gastrointestinal:  Negative for vomiting.   Genitourinary:  Negative for difficulty urinating and flank pain.   Musculoskeletal:  Positive for back pain and myalgias. Negative for arthralgias, gait problem, joint swelling, neck pain and neck stiffness.   Neurological:  Negative for dizziness, weakness, light-headedness, numbness and headaches.   Psychiatric/Behavioral:  Negative for self-injury and suicidal ideas.    All other systems reviewed and are negative.    Physical Exam  Constitutional:       General: She is not in acute distress.     Appearance: Normal appearance.       HENT:      Head: Normocephalic and atraumatic.   Musculoskeletal:      Thoracic back: Tenderness present.      Lumbar back: Tenderness present. Decreased range of motion.   Neurological:      General: No focal deficit present.      Mental Status: She is alert and oriented to person, place, and time.      Cranial Nerves: No dysarthria or facial asymmetry.      Sensory: Sensation is intact.      Motor: Motor function is intact.      Coordination: Coordination is intact.      Gait: Gait is intact.   Psychiatric:         Attention and Perception: Attention normal.         Mood and Affect: Mood normal.         Speech: Speech normal.         Behavior: Behavior is cooperative.        Spine Musculoskeletal Exam    Gait    Gait is normal.    Inspection    Thoracolumbar    Thoracolumbar inspection additional comments: Lumbar hyperlordosis.    Palpation    Thoracolumbar    Tenderness: present      Paraspinous: right and left      Gluteal: right and left      Piriformis: right and left      SI Joint: left    Right      Muscle tone: increased    Left      Muscle tone: increased    Range of Motion    Thoracolumbar       Flexion: 75%. Flexion detail: no pain.      Extension: 75%. Extension detail: no pain.       Right      Lateral bendin%. Lateral bending detail: pain.       Lateral rotation: 75%. Lateral rotation detail: pain.       Left      Lateral bendin%. Lateral bending detail: pain.       Lateral rotation: 75%. Lateral rotation detail: pain.      Strength    Thoracolumbar    Thoracolumbar motor exam is normal.       Sensory    Thoracolumbar    Thoracolumbar sensation is normal.    General    Neurological: alert     Other Orthopedic Tests:  Thoracic/Lumbar/Sacrum: Right Bledsoe's Lumbar painless.  Left Bledsoe's Lumbar with local pain.  Segmental Joint(s): Segmental joint dysfunction was assessed with motion palpation and is identified in the following areas:  Thoracic : T10 and T11  Lumbopelvic / Sacral SIJ : L2, L5, L5/S1, and L SIJ  Upper / Lower Extremities : R Hip and L Hip      Assessment/Plan   Today's Treatment Included: Chiropractic manipulation to the  Segmental Joint(s) Lumbopelvic/Sacral SIJ : L2, L5, L5/S1, and L SIJ Segmental Joint(s) Thoracic : T10 and T11 Segmental Joints Upper/Lower Extremities : R Hip and L Hip  Treatment Techniques Used : Diversified CMT  Soft-Tissue manipulation    Soft-tissue mobilization was performed in the following areas:       Lumbar Paraspinal mm. bilateral, Gluteal mm.  bilateral, and Piriformis bilateral  Psoas bilateral          Patient was shown seated paraspinal stretch.     Treatment Plan: The patient and I discussed the risks and benefits of Chiropractic Care.  Consent for care was given both written and orally by the patient.  Based on the patient's subjective complaints along with the examination findings, it is advised that a course of Chiropractic Treatment by initiated in the form of:   Chiropractic Manipulation (CMT)  Chiropractic treatment recommended at a frequency of 1 times per week for 2 weeks  The goals of the treatment will be to: decrease pain, improve quality of life, improve the ability to stand,  decrease muscular hypertonicity, and improve postural strength  The patient tolerated today's treatment with little or no additional discomfort and was instructed to contact the office for questions or concerns.   Follow up as scheduled.

## 2024-06-10 RX ORDER — HYDROXYZINE HYDROCHLORIDE 25 MG/1
TABLET, FILM COATED ORAL
COMMUNITY
Start: 2022-04-12 | End: 2024-06-13 | Stop reason: WASHOUT

## 2024-06-10 RX ORDER — AZITHROMYCIN 500 MG/1
TABLET, FILM COATED ORAL
COMMUNITY
Start: 2023-10-19 | End: 2024-06-13 | Stop reason: WASHOUT

## 2024-06-10 RX ORDER — DOXYCYCLINE 100 MG/1
100 CAPSULE ORAL EVERY 12 HOURS
COMMUNITY
Start: 2023-08-28 | End: 2024-06-13 | Stop reason: WASHOUT

## 2024-06-10 RX ORDER — METRONIDAZOLE 500 MG/1
500 TABLET ORAL EVERY 12 HOURS
COMMUNITY
Start: 2023-08-28 | End: 2023-09-04

## 2024-06-10 RX ORDER — AZITHROMYCIN 250 MG/1
TABLET, FILM COATED ORAL
COMMUNITY
Start: 2021-06-01 | End: 2024-06-13 | Stop reason: WASHOUT

## 2024-06-10 RX ORDER — NORETHINDRONE ACETATE AND ETHINYL ESTRADIOL 1MG-20(21)
1 KIT ORAL DAILY
COMMUNITY
End: 2024-06-13 | Stop reason: WASHOUT

## 2024-06-10 RX ORDER — VITAMIN A, ASCORBIC ACID, CHOLECALCIFEROL, TOCOPHEROL, THIAMINE MONONITRATE, RIBOFLAVIN, PYRIDOXINE, FOLIC ACID, CYANOCOBALAMIN, CALCIUM CARBONATE, FERROUS FUMARATE, ZINC OXIDE, CUPRIC OXIDE, NIACINAMIDE, AND FISH OIL 27-1-250MG
KIT ORAL
COMMUNITY
Start: 2019-11-18

## 2024-06-10 RX ORDER — ETONOGESTREL AND ETHINYL ESTRADIOL VAGINAL RING .015; .12 MG/D; MG/D
RING VAGINAL
COMMUNITY
Start: 2020-12-03 | End: 2024-06-13 | Stop reason: WASHOUT

## 2024-06-10 RX ORDER — METOCLOPRAMIDE 10 MG/1
TABLET ORAL
COMMUNITY
Start: 2020-09-17

## 2024-06-10 RX ORDER — FLUCONAZOLE 150 MG/1
TABLET ORAL
COMMUNITY
Start: 2023-08-29 | End: 2024-06-13 | Stop reason: WASHOUT

## 2024-06-10 RX ORDER — ACYCLOVIR 400 MG/1
400 TABLET ORAL 3 TIMES DAILY
COMMUNITY
Start: 2023-08-28 | End: 2023-09-07

## 2024-06-10 RX ORDER — HYDROCODONE BITARTRATE AND ACETAMINOPHEN 5; 325 MG/1; MG/1
1 TABLET ORAL EVERY 4 HOURS PRN
COMMUNITY
Start: 2023-08-28 | End: 2024-06-13 | Stop reason: WASHOUT

## 2024-06-10 RX ORDER — PREDNISONE 10 MG/1
TABLET ORAL
COMMUNITY
Start: 2022-04-12 | End: 2024-06-13 | Stop reason: WASHOUT

## 2024-06-12 ENCOUNTER — APPOINTMENT (OUTPATIENT)
Dept: INTEGRATIVE MEDICINE | Facility: CLINIC | Age: 24
End: 2024-06-12
Payer: COMMERCIAL

## 2024-06-13 ENCOUNTER — APPOINTMENT (OUTPATIENT)
Dept: OBSTETRICS AND GYNECOLOGY | Facility: CLINIC | Age: 24
End: 2024-06-13
Payer: COMMERCIAL

## 2024-06-13 VITALS
DIASTOLIC BLOOD PRESSURE: 70 MMHG | BODY MASS INDEX: 28.72 KG/M2 | SYSTOLIC BLOOD PRESSURE: 110 MMHG | WEIGHT: 162.13 LBS

## 2024-06-13 DIAGNOSIS — Z23 NEED FOR DIPHTHERIA-TETANUS-PERTUSSIS (TDAP) VACCINE: ICD-10-CM

## 2024-06-13 DIAGNOSIS — O21.9 NAUSEA AND VOMITING IN PREGNANCY PRIOR TO 22 WEEKS GESTATION (HHS-HCC): ICD-10-CM

## 2024-06-13 DIAGNOSIS — Z3A.30 30 WEEKS GESTATION OF PREGNANCY (HHS-HCC): Primary | ICD-10-CM

## 2024-06-13 PROCEDURE — 99213 OFFICE O/P EST LOW 20 MIN: CPT

## 2024-06-13 PROCEDURE — 90715 TDAP VACCINE 7 YRS/> IM: CPT

## 2024-06-13 PROCEDURE — 90471 IMMUNIZATION ADMIN: CPT

## 2024-06-13 RX ORDER — OMEPRAZOLE 20 MG/1
20 TABLET, DELAYED RELEASE ORAL
COMMUNITY

## 2024-06-13 RX ORDER — ONDANSETRON 4 MG/1
4 TABLET, FILM COATED ORAL EVERY 8 HOURS PRN
Qty: 30 TABLET | Refills: 2 | Status: CANCELLED | OUTPATIENT
Start: 2024-06-13

## 2024-06-13 RX ORDER — ONDANSETRON 4 MG/1
4 TABLET, FILM COATED ORAL EVERY 8 HOURS PRN
Qty: 30 TABLET | Refills: 1 | Status: SHIPPED | OUTPATIENT
Start: 2024-06-13

## 2024-06-13 NOTE — PROGRESS NOTES
Subjective   Dotty Bojorquez is a 24 y.o.  at 30w2d with a working estimated date of delivery of 2024, by Last Menstrual Period who presents for a routine prenatal visit.    Patient reports overall feeling well; no concerns or OB complaints.  Saw chiropractor for low back pain.   Took Prilosec short term for heartburn with relief.  Taking Zofran PRN for nausea.      Objective   Visit Vitals  /70   Wt 73.5 kg (162 lb 2 oz)   LMP 2023   BMI 28.72 kg/m²   OB Status Pregnant   Smoking Status Never   BSA 1.81 m²     Vitals:    24 1100   Weight: 73.5 kg (162 lb 2 oz)      Pregravid Weight/BMI - 56.7 kg (125 lb) / 22.15  Expected Total Weight Gain - 11.5 kg (25 lb)-16 kg (35 lb)  TW.8 kg (37 lb 2 oz)  Fundal height and FHR per prenatal flowsheet.    Assessment/Plan   Tdap today.  Too early to schedule IOL -- Per , can schedule on 7/3 for patient's 39 week date for IOL.  Appointment with MAHI Deutsch on .  Discussed use of Pepcid 20 mg BID for heartburn and Tums PRN.  Nausea remedies reviewed; Zofran Rx renewal provided.    labor and warning signs and symptoms reviewed; patient has emergency answering service phone line number and is aware of when to call.   Remainder of plan per problem list as below.     Medical Problems       Problem List       Supervision of other normal pregnancy, antepartum ([facility])    Overview Addendum 5/15/2024 11:44 AM by Zuleyma Claudio, APRN-CNM     Declines Covid/flu vaccine.  Normal rr NIPS, it's a BOY!    Hx of breast augmentation  -- States that the implant in the left breast adhered to the muscle, and the provider was supposed to repair it, though then lost her medical license, so she has not had this repaired yet... Referral to breast surgery and specialty placed.     Rh NEG -- For Rhogam 28 weeks.     Wants 39 week IOL.  Epidural.         Depression affecting pregnancy ([facility])    Overview Addendum 2024 11:45 AM by Zuleyma turner  DAPHNEY Galdamez     EPDS 15 on 1/24/24.  Referral placed to MAHI Deutsch -- Appt on 6/19.  Zoloft Rx 50 mg sent to pharmacy 2/22/24.             F/U in 2 weeks; review appt with MAHI Deutsch.    DAPHNEY Gallardo

## 2024-06-18 PROBLEM — N76.0 VAGINITIS: Status: RESOLVED | Noted: 2024-06-18 | Resolved: 2024-06-18

## 2024-06-18 PROBLEM — R68.82 REDUCED LIBIDO: Status: ACTIVE | Noted: 2024-06-18

## 2024-06-18 PROBLEM — F32.A DEPRESSIVE DISORDER IN MOTHER AFFECTING PREGNANCY (HHS-HCC): Status: ACTIVE | Noted: 2024-01-24

## 2024-06-18 PROBLEM — O99.340 DEPRESSIVE DISORDER IN MOTHER AFFECTING PREGNANCY (HHS-HCC): Status: ACTIVE | Noted: 2024-01-24

## 2024-06-18 PROBLEM — R68.82 LOW LIBIDO: Status: ACTIVE | Noted: 2024-06-18

## 2024-06-19 ENCOUNTER — APPOINTMENT (OUTPATIENT)
Dept: BEHAVIORAL HEALTH | Facility: CLINIC | Age: 24
End: 2024-06-19
Payer: COMMERCIAL

## 2024-06-19 DIAGNOSIS — F32.A DEPRESSION DURING PREGNANCY IN SECOND TRIMESTER (HHS-HCC): ICD-10-CM

## 2024-06-19 DIAGNOSIS — O99.342 DEPRESSION DURING PREGNANCY IN SECOND TRIMESTER (HHS-HCC): ICD-10-CM

## 2024-06-19 PROCEDURE — 99205 OFFICE O/P NEW HI 60 MIN: CPT | Performed by: CLINICAL NURSE SPECIALIST

## 2024-06-19 RX ORDER — BUPROPION HYDROCHLORIDE 150 MG/1
150 TABLET ORAL EVERY MORNING
Qty: 90 TABLET | Refills: 0 | Status: SHIPPED | OUTPATIENT
Start: 2024-06-19 | End: 2025-06-19
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
## 2024-06-20 ENCOUNTER — APPOINTMENT (OUTPATIENT)
Dept: INTEGRATIVE MEDICINE | Facility: CLINIC | Age: 24
End: 2024-06-20
Payer: COMMERCIAL

## 2024-06-20 DIAGNOSIS — M99.04 SEGMENTAL AND SOMATIC DYSFUNCTION OF SACRAL REGION: ICD-10-CM

## 2024-06-20 DIAGNOSIS — M99.05 SEGMENTAL AND SOMATIC DYSFUNCTION OF PELVIC REGION: ICD-10-CM

## 2024-06-20 DIAGNOSIS — M79.10 MYALGIA: ICD-10-CM

## 2024-06-20 DIAGNOSIS — M79.9 POSTURAL STRAIN: ICD-10-CM

## 2024-06-20 DIAGNOSIS — M99.03 SEGMENTAL AND SOMATIC DYSFUNCTION OF LUMBAR REGION: Primary | ICD-10-CM

## 2024-06-20 DIAGNOSIS — M99.06 SEGMENTAL AND SOMATIC DYSFUNCTION OF LOWER EXTREMITY: ICD-10-CM

## 2024-06-20 DIAGNOSIS — M54.9 BACK PAIN IN PREGNANCY (HHS-HCC): ICD-10-CM

## 2024-06-20 DIAGNOSIS — O99.891 BACK PAIN IN PREGNANCY (HHS-HCC): ICD-10-CM

## 2024-06-20 PROCEDURE — 98941 CHIROPRACT MANJ 3-4 REGIONS: CPT | Performed by: CHIROPRACTOR

## 2024-06-20 NOTE — PROGRESS NOTES
Subjective   Patient ID: Dotty Bojorquez is a 24 y.o. female who presents June 20, 2024 for low back pain in pregnancy.    (2/15) [facility]  RANDY: 8/20/24    Today, the patient rates their degree of pain as a 3 out of 10 on the numeric pain rating scale.     Dotty returns for continued treatment of low back pain in pregnancy. She states that she had improvement in symptoms after last visit that has lasted until today. She states that she had a mild increase in soreness after last visit that lasted for that day. Denies any associated symptoms or change in medical history since last visit and will follow up in 1-2 weeks.   _____________________________________________  Initial Exam:  Dotty presents for evaluation and treatment of of low back pain in pregnancy. She states that symptoms began the beginning of her second trimester without injury/trauma/accident. She states that they have worsened since then. She states that she had mild low back pain with her previous pregnancy, but these symptoms are much more severe. She states that pain goes across the low back and into the buttock. She states that she has pain that wraps around to the groin. She states that she occasionally has numbness in her legs while lying on her sides. She states that lying down, standing and sitting increase symptoms , while leaning forward and tylenol help relieve symptoms. She states that she has not had any previous treatment for these symptoms. She states that she had chiropractic care after her first pregnancy and found treatment beneficial. Patient denies any headaches, difficulty speaking/swallowing, vision changes, bowel/bladder issues, chest pain/shortness of breath.           Objective   Physical Exam  Constitutional:       General: She is not in acute distress.     Appearance: Normal appearance.       HENT:      Head: Normocephalic and atraumatic.   Musculoskeletal:      Thoracic back: Tenderness present.      Lumbar back: Tenderness  present. Decreased range of motion.   Neurological:      General: No focal deficit present.      Mental Status: She is alert and oriented to person, place, and time.      Cranial Nerves: No dysarthria or facial asymmetry.      Sensory: Sensation is intact.      Motor: Motor function is intact.      Coordination: Coordination is intact.      Gait: Gait is intact.   Psychiatric:         Attention and Perception: Attention normal.         Mood and Affect: Mood normal.         Speech: Speech normal.         Behavior: Behavior is cooperative.         Palpation of the following region(s) revealed:      Lumbar: Lumbar paraspinals bilateral, hypertonicity and tenderness.  Gluteal bilateral, hypertonicity and tenderness.  Piriformis bilateral, hypertonicity and tenderness.  Psoas bilateral, hypertonicity and tenderness.        Segmental Joint(s): Segmental joint dysfunction was assessed with motion palpation and is identified in the following areas:  Lumbopelvic / Sacral SIJ : L2, L5, L5/S1, and L SIJ  Upper / Lower Extremities : R Hip and L Hip      Assessment/Plan   Today's Treatment Included: Chiropractic manipulation to the  Segmental Joint(s) Lumbopelvic/Sacral SIJ : L2, L5, L5/S1, and L SIJ  Segmental Joints Upper/Lower Extremities : R Hip and L Hip  Treatment Techniques Used : Diversified CMT  Soft-Tissue manipulation    Soft-tissue mobilization was performed in the following areas:       Lumbar Paraspinal mm. bilateral, Gluteal mm.  bilateral, and Piriformis bilateral            The patient tolerated today's treatment with little or no additional discomfort and was instructed to contact the office for questions or concerns.

## 2024-06-26 ENCOUNTER — APPOINTMENT (OUTPATIENT)
Dept: INTEGRATIVE MEDICINE | Facility: CLINIC | Age: 24
End: 2024-06-26
Payer: COMMERCIAL

## 2024-06-27 ENCOUNTER — APPOINTMENT (OUTPATIENT)
Dept: OBSTETRICS AND GYNECOLOGY | Facility: CLINIC | Age: 24
End: 2024-06-27
Payer: COMMERCIAL

## 2024-07-01 ENCOUNTER — APPOINTMENT (OUTPATIENT)
Dept: OBSTETRICS AND GYNECOLOGY | Facility: CLINIC | Age: 24
End: 2024-07-01
Payer: COMMERCIAL

## 2024-07-02 ENCOUNTER — APPOINTMENT (OUTPATIENT)
Dept: BEHAVIORAL HEALTH | Facility: HOSPITAL | Age: 24
End: 2024-07-02
Payer: COMMERCIAL

## 2024-07-02 DIAGNOSIS — F32.A DEPRESSION AFFECTING PREGNANCY (HHS-HCC): ICD-10-CM

## 2024-07-02 DIAGNOSIS — O99.340 DEPRESSION AFFECTING PREGNANCY (HHS-HCC): ICD-10-CM

## 2024-07-02 PROCEDURE — 99214 OFFICE O/P EST MOD 30 MIN: CPT | Mod: GT,U1 | Performed by: CLINICAL NURSE SPECIALIST

## 2024-07-02 PROCEDURE — 99214 OFFICE O/P EST MOD 30 MIN: CPT | Performed by: CLINICAL NURSE SPECIALIST

## 2024-07-02 RX ORDER — BUPROPION HYDROCHLORIDE 75 MG/1
75 TABLET ORAL DAILY
Qty: 90 TABLET | Refills: 0 | Status: SHIPPED | OUTPATIENT
Start: 2024-07-02 | End: 2025-07-02

## 2024-07-02 NOTE — PROGRESS NOTES
Subjective   Patient ID: Dotty Bojorquez is a 24 y.o. female who presents for MDD Peripartum .   HPI  Review of Systems   Psych Review of Symptoms  Objective   Physical Exam  INTERIM: Now at 33wga for unplanned pregnancy w/ EDC planned induction for 8/15/2024. Started  Wellbutrin 150mg XL light headed hot, highly anxious, as well as headaches stopped x 3 days , resumed and same, some intermittent anxiety attacks  self rates at # 7/10. Got , had baby shower, making efforts to prepare for baby.  On wait list at [facility] for resolution of past IAB in about 3 weeks. Notes felt better after discussing IAB as initial appt last week. Mood no longer crying, feeling sad, depressed, endorsing feeling better, smiling, excited when taking about baby, feeling more connected to baby, ie sitting in rocking chair, imaging how it will be with baby, moving forward on potential names for baby.  Sleep disrupted as per third trimester.      Assessment/Plan   IMP: 24 SF4P1 PMH  Presents at 33wga for unplanned pregnancy w/ EDC 8/20/2024. FOB # 2 is her now . . Started Bupropion 150mg XL but finding SES of increased anxiety and headaches intolerable. Mood notably improved  feeling more attached to baby, had  baby shower, making preparation, imagining caring for him, moving forward w/ naming baby. Denies feeling overwhelmed, sad, depressed, no longer crying 2-3 times per week. Contact [facility] for resolution of past IAB and wait listed. Noted felt better when discussed this at last appt. Sleep: Disrupted as per third trimester.       Discussed changing Wellbutrin from 150 mg XL to 75 mg IR every day for now w/ aim for BID as tolerated. Discussed r/b/a for use of Wellbutrin IR during pregnancy for targeting depressive sx. Awaiting [facility] to resolution of pregnancy loss and pt in agreement. No imminent safety issues.      Diagnoses   MDD Recurrent Peripartum Onset  Third  Trimester Pregnancy   Past unresolved IAB   Hx of PMAD      Treatment   Stop Wellbutrin 150 mg XL   Start Wellbutrin 75 mg po qam with aim for BID as tolerated ordered 30 2RF      Continue taking Sertraline 50 gm po qam sufficient supply    Change dosing time of Sertraline from hs to am . Be sure to take w/ food    RTC in 3 weeks     Wait listed for [facility] for treatment of pregnancy loss   Contact me via [employer] as needed

## 2024-07-03 ENCOUNTER — APPOINTMENT (OUTPATIENT)
Dept: OBSTETRICS AND GYNECOLOGY | Facility: CLINIC | Age: 24
End: 2024-07-03
Payer: COMMERCIAL

## 2024-07-03 VITALS — WEIGHT: 163 LBS | BODY MASS INDEX: 28.87 KG/M2 | DIASTOLIC BLOOD PRESSURE: 72 MMHG | SYSTOLIC BLOOD PRESSURE: 111 MMHG

## 2024-07-03 DIAGNOSIS — Z3A.39 39 WEEKS GESTATION OF PREGNANCY (HHS-HCC): Primary | ICD-10-CM

## 2024-07-03 DIAGNOSIS — Z36.89 NST (NON-STRESS TEST) REACTIVE (HHS-HCC): ICD-10-CM

## 2024-07-03 DIAGNOSIS — O36.8130 DECREASED FETAL MOVEMENTS IN THIRD TRIMESTER, SINGLE OR UNSPECIFIED FETUS (HHS-HCC): ICD-10-CM

## 2024-07-03 PROCEDURE — 59025 FETAL NON-STRESS TEST: CPT

## 2024-07-03 PROCEDURE — 99214 OFFICE O/P EST MOD 30 MIN: CPT

## 2024-07-03 NOTE — PROCEDURES
Procedures    Dotty Bojorquez, a  at 33w1d with an RANDY of 2024, by Last Menstrual Period, was seen at [facility] for a nonstress test.    Non-Stress Test   Baseline Fetal Heart Rate for Non-Stress Test: 140 BPM  Variability in Waveform for Non-Stress Test: Moderate  Accelerations in Non-Stress Test: Yes  Decelerations in Non-Stress Test: None  Contractions in Non-Stress Test: Not present  Interpretation of Non-Stress Test   Interpretation of Non-Stress Test: Reactive           OLIVIA Gallardo-MONI

## 2024-07-03 NOTE — PROGRESS NOTES
"Subjective   Dotty Bojorquez is a 24 y.o.  at 33w1d with a working estimated date of delivery of 2024, by Last Menstrual Period who presents for a routine prenatal visit.    Patient reports overall feeling well.  Concern for decreased fetal movement; states that the baby has gone from previously being very active, to now only moving \"four times per day.\"    Feels that she has been having a hard time bonding with this baby, though recently started working on the nursery and feel that is helping.      Current FOB is different than FOB from first child.     Has a trip to [place] planned next week.     Objective   Visit Vitals  /72   Wt 73.9 kg (163 lb)   LMP 2023   BMI 28.87 kg/m²   OB Status Pregnant   Smoking Status Never   BSA 1.81 m²     Vitals:    24 1450   Weight: 73.9 kg (163 lb)      Pregravid Weight/BMI - 56.7 kg (125 lb) / 22.15  Expected Total Weight Gain - 11.5 kg (25 lb)-16 kg (35 lb)  TW.2 kg (38 lb)  Fundal height and FHR per prenatal flowsheet.    Assessment/Plan   NST done today, reactive; baby very active during monitoring.  FKCs reviewed; when to call emergency answering service phone line number.    Patient strongly desires 39 week IOL -- Order placed for IOL to be scheduled on 8/15 @ , and I am the midwife on call.  The need, benefits, and risks of induction were discussed with the patient. Induction methods were reviewed including cervical ripening, balloon placement, Cytotec, and Pitocin. Reasonable alternatives to induction were discussed as well as the risks of remaining pregnant. All patient questions were answered. The patient accepts the risks of induction and desires to proceed with induction.    Reviewed visit with MAHI Deutsch -- Taking Wellbutrin 75 mg, Zoloft 50 mg daily.    Travel precautions reviewed; encouraged wearing compression stockings, frequent breaks during the drive to walk, being mindful of  labor signs and nearest hospital " in case of emergency.     labor and warning signs and symptoms reviewed; patient has emergency answering service phone line number and is aware of when to call.   Remainder of plan per problem list as below.     Medical Problems       Problem List       Supervision of other normal pregnancy, antepartum ([facility])    Overview Addendum 5/15/2024 11:44 AM by DAPHNEY Gallardo     Declines Covid/flu vaccine.  Normal rr NIPS, it's a BOY!    Hx of breast augmentation  -- States that the implant in the left breast adhered to the muscle, and the provider was supposed to repair it, though then lost her medical license, so she has not had this repaired yet... Referral to breast surgery and specialty placed.     Rh NEG -- For Rhogam 28 weeks.     Wants 39 week IOL.  Epidural.         Depression affecting pregnancy ([facility])    Overview Addendum 2024 10:27 AM by DAPHNEY Gallardo     EPDS 15 on 24.  Referral placed to MAHI Deutsch -- Appt on .  Zoloft Rx 50 mg sent to pharmacy 24.    Reviewed visit with MAHI Deutsch -- Trial of Wellbutrin 150 mg XL; continue Zoloft 50 mg every AM.          Low libido    Reduced libido    Depressive disorder in mother affecting pregnancy ([facility])        F/U in 2 weeks.    DAPHNEY Gallardo

## 2024-07-11 ENCOUNTER — APPOINTMENT (OUTPATIENT)
Dept: OBSTETRICS AND GYNECOLOGY | Facility: CLINIC | Age: 24
End: 2024-07-11
Payer: COMMERCIAL

## 2024-07-19 PROBLEM — D64.9 ANEMIA: Status: ACTIVE | Noted: 2017-07-17

## 2024-07-19 PROBLEM — K59.00 CONSTIPATION: Status: ACTIVE | Noted: 2024-07-19

## 2024-07-19 PROBLEM — G44.209 TENSION HEADACHE: Status: ACTIVE | Noted: 2024-07-19

## 2024-07-19 PROBLEM — N92.0 SPOTTING: Status: ACTIVE | Noted: 2024-07-19

## 2024-07-19 PROBLEM — G47.00 INSOMNIA: Status: ACTIVE | Noted: 2024-07-19

## 2024-07-19 PROBLEM — R39.9 UTI SYMPTOMS: Status: ACTIVE | Noted: 2024-07-19

## 2024-07-19 PROBLEM — D23.9 DERMATOFIBROMA: Status: ACTIVE | Noted: 2024-07-19

## 2024-07-19 PROBLEM — N80.03 ADENOMYOSIS: Status: ACTIVE | Noted: 2024-07-19

## 2024-07-19 PROBLEM — N18.9 CHRONIC KIDNEY DISEASE: Status: ACTIVE | Noted: 2017-07-17

## 2024-07-19 PROBLEM — N80.03 ADENOMYOSIS OF UTERUS: Status: ACTIVE | Noted: 2024-07-19

## 2024-07-19 PROBLEM — J30.9 ALLERGIC RHINITIS: Status: ACTIVE | Noted: 2024-07-19

## 2024-07-19 PROBLEM — E86.0 DEHYDRATION: Status: ACTIVE | Noted: 2024-07-19

## 2024-07-19 PROBLEM — R11.2 NAUSEA AND VOMITING: Status: ACTIVE | Noted: 2024-07-19

## 2024-07-19 PROBLEM — S69.91XA INJURY OF FINGER OF RIGHT HAND: Status: ACTIVE | Noted: 2024-07-19

## 2024-07-19 PROBLEM — R11.10 VOMITING: Status: ACTIVE | Noted: 2024-07-19

## 2024-07-19 PROBLEM — R59.0 CERVICAL LYMPHADENOPATHY: Status: ACTIVE | Noted: 2024-07-19

## 2024-07-19 PROBLEM — N64.4 BREAST PAIN, LEFT: Status: ACTIVE | Noted: 2024-07-19

## 2024-07-19 PROBLEM — R39.9 SYMPTOMS INVOLVING URINARY SYSTEM: Status: ACTIVE | Noted: 2024-07-19

## 2024-07-19 PROBLEM — L85.8 KERATOSIS PILARIS: Status: ACTIVE | Noted: 2024-07-19

## 2024-07-19 PROBLEM — Z11.3 ENCNTR SCREEN FOR INFECTIONS W SEXL MODE OF TRANSMISS: Status: ACTIVE | Noted: 2017-08-15

## 2024-07-23 ENCOUNTER — APPOINTMENT (OUTPATIENT)
Dept: BEHAVIORAL HEALTH | Facility: HOSPITAL | Age: 24
End: 2024-07-23
Payer: COMMERCIAL

## 2024-07-24 ENCOUNTER — APPOINTMENT (OUTPATIENT)
Dept: OBSTETRICS AND GYNECOLOGY | Facility: CLINIC | Age: 24
End: 2024-07-24
Payer: COMMERCIAL

## 2024-07-26 ENCOUNTER — ROUTINE PRENATAL (OUTPATIENT)
Dept: OBSTETRICS AND GYNECOLOGY | Facility: CLINIC | Age: 24
End: 2024-07-26
Payer: COMMERCIAL

## 2024-07-26 VITALS — WEIGHT: 168 LBS | BODY MASS INDEX: 29.76 KG/M2 | SYSTOLIC BLOOD PRESSURE: 116 MMHG | DIASTOLIC BLOOD PRESSURE: 78 MMHG

## 2024-07-26 DIAGNOSIS — Z3A.36 36 WEEKS GESTATION OF PREGNANCY (HHS-HCC): ICD-10-CM

## 2024-07-26 DIAGNOSIS — Z34.80 SUPERVISION OF OTHER NORMAL PREGNANCY, ANTEPARTUM (HHS-HCC): Primary | ICD-10-CM

## 2024-07-26 PROCEDURE — 87081 CULTURE SCREEN ONLY: CPT

## 2024-07-26 PROCEDURE — 99213 OFFICE O/P EST LOW 20 MIN: CPT | Performed by: ADVANCED PRACTICE MIDWIFE

## 2024-07-26 NOTE — PROGRESS NOTES
Subjective   Dotty Bojorquez is a 24 y.o.  at 36w3d here for a routine prenatal visit: she is complaining of difficulty sleeping and desire to move up IOL as much as possible (daughter starts school the next week). Mood is stable. She reports +FM and denies vaginal bleeding, abdominal pain, leakage of fluid.     Her pregnancy is complicated by:  Pregnancy Problems (from 24 to present)       Problem Noted Resolved    Supervision of other normal pregnancy, antepartum ([facility]) 2024 by DAPHNEY Velasquez No    Overview Addendum 2024  9:07 PM by DAPHNEY Velasquez     Declines Covid/flu vaccine.  Normal rr NIPS, it's a BOY!    Hx of breast augmentation  -- States that the implant in the left breast adhered to the muscle, and the provider was supposed to repair it, though then lost her medical license, so she has not had this repaired yet... Referral to breast surgery and specialty placed.     Rh NEG -- For Rhogam 28 weeks.     Wants 39 week IOL -- 8/15 @ .  Epidural.         Depression affecting pregnancy ([facility]) 2024 by DAPHNEY Velasquez No    Overview Addendum 7/3/2024  3:16 PM by DAPHNEY Velasquez     EPDS 15 on 24.  Referral placed to MAHI Deutsch -- Appt on .  Zoloft Rx 50 mg sent to pharmacy 24.    Reviewed visit with MAHI Deutsch -- Wellbutrin 75 mg; continue Zoloft 50 mg every AM.          Depressive disorder in mother affecting pregnancy ([facility]) 2024 by Wilfredo Reza, FERNANDO No             Objective  see flow sheet    Assessment/Plan   24 y.o.  at 36w3d, routine prenatal care  IOL changed to 8/14 at AM per patient request  GBS collected today  Warning s/s discussed, and FM awareness, and PTL precautions reviewed. Patient has the on-call CNM number ([phone]) for after hour emergencies.     Follow up in 1 week and/or as needed for a routine prenatal visit.  DAPHNEY David

## 2024-07-26 NOTE — PATIENT INSTRUCTIONS
Labor Induction    Induction is the process of starting labor before natural labor begins. This handout will help you understand labor induction and what to expect.  Most labors will start spontaneously by 41 weeks.    Reasons for induction of labor     A member of your care team may suggest induction of labor. This may be because of a medical condition or disease. There are different reasons for induction of labor. Some common medical reasons include:   ? Preeclampsia (a serious blood pressure condition in the pregnant person)  ? High blood pressure (gestational hypertension or chronic hypertension)  ? Diabetes   ? Poor fetal growth  ? Low amniotic fluid (fluid in the sac around the baby)   ? Ruptured bag of water before labor   Please note that there may be a reason for an induction that is not on this list.  A term scheduled (also called risk reducing induction) induction is when there is not a medical reason to induce labor, but you have reached 39 weeks of pregnancy.   Ultimately, the decision to induce at 39 weeks (if there is no medical reason) is purely your choice.  This is a guide to educate you on the process of induction of labor and what you should realistically expect.  If you chose a 39-week term induction for purely risk reduction, it will be scheduled on the labor and delivery unit for a date and time when you have reached a minimum of 39.0 weeks gestation.  Date and time of induction will be contingent on the labor and delivery unit availability, is subject to change, and postponed depending on unit acuity and census.   You will get specific instructions on how to prepare for your scheduled induction. Please be sure to eat a healthy meal before you come in for your labor induction unless your care team tells you not to. Your care team will tell you if you can eat during labor induction. This depends on the type of induction, the phase of induction and your labor progress.      Your cervix    As you  anxiously await the birth of your baby, your body is hard at work preparing for birth.  We look at when assessing your cervix for the readiness of labor or making a decision about your method of induction many factors.  This is called a Galeano Score.  It assess five important factors in calculating your Galeano score:   How dilated your cervix is   How thinned out your cervix is (also called effacement)   Where the baby's head is in your pelvis   Where your cervix is located (anterior, mid position, posterior)   How soft your cervix is (firm, medium consistency, soft)    All of these factors are added up.  If your score is less than 8, it tells us that the best choice for labor induction is to ripen your cervix with the methods below.  A Bishops score greater than 8 indicates that your labor is appropriate to induce with Pitocin.      Methods of labor induction    Your care team will use 1 or more of the following methods for induction of labor. The method will depend on your physical exam and medical history.  It is important to remember that if you are accepting of an induction that your induction will be actively managed.      Cervical ripening   Ripening is the process of softening, thinning (effacing), and opening (dilating) the cervix. Your provider will examine your cervix in the office or at the hospital to decide if this is right for you. They will use either medication or mechanical techniques. Sometimes they use a combination of both.   Medication cervical ripening agents are Prostaglandin medications, which softens and thins the cervix. This may cause contractions.   The most commonly used medication is Cytotec (misoprostol).   You can take this medication by mouth as a pill, or your provider will insert it as a tablet into your vagina. This can be done every 3 hours.    Cytotec has been used for approximately 25 years for labor induction.  It can cause excessive uterine contractions where a medication  called Terbutaline can be used to slow down the contractions.  Every labor is different.  The nurses are always assessing your uterine activity to make sure it is adequately relaxing in between contractions.  There are contraindications for the use of Cytotec - prior  section or prior uterine surgery.    Mechanical cervical ripening   A cervical ripening balloon is a device to mechanically open the cervix. It causes the cervix to release natural prostaglandin hormones in the body. This is done by inserting a thin, flexible catheter, which is placed through the opening in your cervix using either a speculum or a digital exam.  The provider will fill either 1 or 2 small balloons at the end of the device with water. The balloons puts gentle, constant pressure on your cervix. This causes your body to release prostaglandins that help ripen the cervix.   In some instances, the balloon is deflated and removed by the provider (after 12 hours of the balloon being in).  If the balloon comes out on its own, this is a good sign, as your cervix is opening.   If your water breaks while the balloon is in - that is ok.    Medicine for Induction  Pitocin® also called Oxytocin is a hormone in your body that causes labor to start and contractions to begin. Pitocin® is a medication version of oxytocin. You will get Pitocin through an IV (into the vein) line in your arm.  If your cervix does not need ripening, your induction will likely begin with Pitocin. Your nurse will start the Pitocin at a low dose through an IV pump. The rate will be gradually increased every 30 minutes until a contraction pattern of approximately every 3 minutes occur. Initially, this will slowly cause contractions to help your cervix thin and dilate. Your care team will carefully monitor your contractions and your baby's heart rate through continuous monitoring. They will adjust the rate of the Pitocin, as needed. Most patients become aware of their  contractions about 30 to 60 minutes after starting Pitocin. You may feel the contractions as mild, period-like cramping. Contractions usually become stronger and more frequent as labor progresses.      Rupture of membranes   Rupture of membranes means that the bag containing the amniotic fluid around the baby has broken. This is also known as “your water breaking.”   Sometimes this happens on its own during the induction and labor process.  When your provider breaks your water, it is called “artificial rupture of membranes.” To break your bag of water, your cervix needs to be at least 1 to 2 centimeters dilated (about the size of a dime). The baby's head also needs to be well applied to your cervix.  Your provider will insert a tiny plastic hook into your vagina as they examine your cervix to break your water. You may feel the amniotic fluid running out as the membranes break. When your water breaks, your contractions are likely to become stronger and closer together. This helps your labor progress and is typically utilized during an induction.  No matter how we induce your labor, a few factors may affect how long it takes you to give birth. These include how long it takes for your cervix to thin and open, and when contractions begin. If this is your first labor, the induction process will usually last longer than if you have labored before. This is different for everyone.        What will my induction look like?    First labor and cervix not favorable (1cm or less)  Admit to labor and delivery  IV inserted and blood drawn  Your provider will check your cervix, determine your Galeano's score, and decide your cervical ripening choices.   Pitocin will be started approximately 12-15 hours later along with breaking your water.  Typically, your baby born will be 12-24 hours later.  This can vary.    Previous vaginal delivery or first labor cervix 2-3 cm  Admit to labor and delivery.  IV inserted and blood drawn.  Pitocin  initiated through the IV, after several hours of contractions and provider will decide with you about breaking your water.  Baby will typically be born within 12-18 hours.  You and your baby will be closely monitored throughout all phases of labor. Cervical exams will depend on your individual labor progress. You will have an IV but will be encouraged to change positions during your labor.  You can ask if the wireless monitor is available to move around out of the bed.  If you and your baby are doing well, the goal is to be patient and make every effort to have a vaginal delivery.   ? If your cervix is dilated less than 6 centimeters, the goal is to allow at least 12 to 18 hours after rupture of membranes and being on Pitocin for labor to progress before we consider discussing a  section. This is also called “failed induction of labor.”  Progress means your cervix continues to dilate and efface and/or the baby is moving into position (lower in your pelvis).   ? If your cervix is dilated 6 centimeters or more, the goal is to have at least 4 to 6 hours of strong, effective contractions. Your care team will decide if your labor progress has stalled and the next steps to take. If there are health and safety concerns about you or your baby, your care team may recommend a  delivery ( surgery). Our goal is to include you in decisions about your care. If you have any questions at any time about your labor, please inquire.   A safe delivery is a team effort and you are a valuable member of the team.

## 2024-07-28 LAB — GP B STREP GENITAL QL CULT: NORMAL

## 2024-07-29 LAB — GP B STREP GENITAL QL CULT: NORMAL

## 2024-07-31 ENCOUNTER — APPOINTMENT (OUTPATIENT)
Dept: OBSTETRICS AND GYNECOLOGY | Facility: CLINIC | Age: 24
End: 2024-07-31
Payer: COMMERCIAL

## 2024-07-31 VITALS — BODY MASS INDEX: 29.9 KG/M2 | SYSTOLIC BLOOD PRESSURE: 117 MMHG | DIASTOLIC BLOOD PRESSURE: 77 MMHG | WEIGHT: 168.8 LBS

## 2024-07-31 DIAGNOSIS — Z3A.37 37 WEEKS GESTATION OF PREGNANCY (HHS-HCC): Primary | ICD-10-CM

## 2024-07-31 PROBLEM — S69.91XA INJURY OF FINGER OF RIGHT HAND: Status: RESOLVED | Noted: 2024-07-19 | Resolved: 2024-07-31

## 2024-07-31 PROBLEM — Z11.3 ENCNTR SCREEN FOR INFECTIONS W SEXL MODE OF TRANSMISS: Status: RESOLVED | Noted: 2017-08-15 | Resolved: 2024-07-31

## 2024-07-31 PROBLEM — G47.00 INSOMNIA: Status: RESOLVED | Noted: 2024-07-19 | Resolved: 2024-07-31

## 2024-07-31 PROBLEM — G44.209 TENSION HEADACHE: Status: RESOLVED | Noted: 2024-07-19 | Resolved: 2024-07-31

## 2024-07-31 PROBLEM — N92.0 SPOTTING: Status: RESOLVED | Noted: 2024-07-19 | Resolved: 2024-07-31

## 2024-07-31 PROCEDURE — 99213 OFFICE O/P EST LOW 20 MIN: CPT

## 2024-07-31 NOTE — PROGRESS NOTES
Subjective   Dotty Bojorquez is a 24 y.o.  at 37w1d with a working estimated date of delivery of 2024, by Last Menstrual Period who presents for a routine prenatal visit.    Patient reports overall feeling well; no concerns or OB complaints.  Good fetal movement.  Occasional contractions, though denies any that are regular and/or persistent.  Denies LOF.  Denies vaginal bleeding.  Desires cervical exam today.    Objective   Visit Vitals  /77   Wt 76.6 kg (168 lb 12.8 oz)   LMP 2023   BMI 29.90 kg/m²   OB Status Pregnant   Smoking Status Never   BSA 1.85 m²     Vitals:    24 1100   Weight: 76.6 kg (168 lb 12.8 oz)      Pregravid Weight/BMI - 56.7 kg (125 lb) / 22.15  Expected Total Weight Gain - 11.5 kg (25 lb)-16 kg (35 lb)  TW.9 kg (43 lb 12.8 oz)  Fundal height and FHR per prenatal flowsheet.    Assessment/Plan   Reviewed GBS negative result.  Labor and warning signs and symptoms reviewed; patient has emergency answering service phone line number and is aware of when to call.   Remainder of plan per problem list as below.     Medical Problems       Problem List       Supervision of other normal pregnancy, antepartum ([facility])    Overview Addendum 2024 12:01 PM by OLIVIA David-MONI     Declines Covid/flu vaccine.  Normal rr NIPS, it's a BOY!    Hx of breast augmentation  -- States that the implant in the left breast adhered to the muscle, and the provider was supposed to repair it, though then lost her medical license, so she has not had this repaired yet... Referral to breast surgery and specialty placed.     Rh NEG -- For Rhogam 28 weeks.     Wants 39 week IOL --  @ 0800.  Epidural.         Depression affecting pregnancy ([facility])    Overview Addendum 7/3/2024  3:16 PM by OLIVIA Velasquez-MONI     EPDS 15 on 24.  Referral placed to MAHI Deutsch -- Appt on .  Zoloft Rx 50 mg sent to pharmacy 24.    Reviewed visit with MAHI Deutsch -- Wellbutrin  75 mg; continue Zoloft 50 mg every AM.          Low libido    Reduced libido    Depressive disorder in mother affecting pregnancy ([facility])    Chronic kidney disease    Cervical lymphadenopathy    Breast pain, left    Allergic rhinitis    Adenomyosis of uterus    Adenomyosis    Encntr screen for infections w sexl mode of transmiss    Constipation    Dehydration    Dermatofibroma    Anemia    Injury of finger of right hand    Insomnia    Keratosis pilaris    Nausea and vomiting    Vomiting    Spotting    Tension headache    Symptoms involving urinary system    UTI symptoms        F/U in 1 week.    Zuleyma Franklin, APRN-CNM

## 2024-08-06 ENCOUNTER — APPOINTMENT (OUTPATIENT)
Dept: OBSTETRICS AND GYNECOLOGY | Facility: CLINIC | Age: 24
End: 2024-08-06
Payer: COMMERCIAL

## 2024-08-07 ENCOUNTER — APPOINTMENT (OUTPATIENT)
Dept: OBSTETRICS AND GYNECOLOGY | Facility: CLINIC | Age: 24
End: 2024-08-07
Payer: COMMERCIAL

## 2024-08-07 VITALS — SYSTOLIC BLOOD PRESSURE: 123 MMHG | WEIGHT: 170.8 LBS | DIASTOLIC BLOOD PRESSURE: 77 MMHG | BODY MASS INDEX: 30.26 KG/M2

## 2024-08-07 DIAGNOSIS — F32.A DEPRESSION AFFECTING PREGNANCY (HHS-HCC): ICD-10-CM

## 2024-08-07 DIAGNOSIS — O99.340 DEPRESSIVE DISORDER IN MOTHER AFFECTING PREGNANCY (HHS-HCC): ICD-10-CM

## 2024-08-07 DIAGNOSIS — O99.340 DEPRESSION AFFECTING PREGNANCY (HHS-HCC): ICD-10-CM

## 2024-08-07 DIAGNOSIS — F32.A DEPRESSIVE DISORDER IN MOTHER AFFECTING PREGNANCY (HHS-HCC): ICD-10-CM

## 2024-08-07 DIAGNOSIS — Z34.80 SUPERVISION OF OTHER NORMAL PREGNANCY, ANTEPARTUM (HHS-HCC): ICD-10-CM

## 2024-08-07 PROCEDURE — 99213 OFFICE O/P EST LOW 20 MIN: CPT

## 2024-08-07 NOTE — PROGRESS NOTES
Subjective     Dotty Bojorquez is a 24 y.o.  at 38w1d with a working estimated date of delivery of 2024, by Last Menstrual Period who presents for a routine prenatal visit. She denies vaginal bleeding, leakage of fluid, decreased fetal movements, or contractions.    Her pregnancy is complicated by:  Medical Problems       Problem List       Supervision of other normal pregnancy, antepartum ([facility])    Overview Addendum 2024 12:01 PM by OLIVIA David-MONI     Declines Covid/flu vaccine.  Normal rr NIPS, it's a BOY!    Hx of breast augmentation  -- States that the implant in the left breast adhered to the muscle, and the provider was supposed to repair it, though then lost her medical license, so she has not had this repaired yet... Referral to breast surgery and specialty placed.     Rh NEG -- For Rhogam 28 weeks.     Wants 39 week IOL --  @ 0800.  Epidural.         Depression affecting pregnancy ([facility])    Overview Addendum 7/3/2024  3:16 PM by OLIVIA Velasquez-MONI     EPDS 15 on 24.  Referral placed to MAHI Deutsch -- Appt on .  Zoloft Rx 50 mg sent to pharmacy 24.    Reviewed visit with MAHI Deutsch -- Wellbutrin 75 mg; continue Zoloft 50 mg every AM.          Low libido    Reduced libido    Depressive disorder in mother affecting pregnancy ([facility])    Chronic kidney disease    Cervical lymphadenopathy    Breast pain, left    Allergic rhinitis    Adenomyosis of uterus    Adenomyosis    Constipation    Dehydration    Dermatofibroma    Anemia    Keratosis pilaris    Nausea and vomiting    Vomiting    Symptoms involving urinary system    UTI symptoms          Objective   Weight: 77.5 kg (170 lb 12.8 oz)  TW.8 kg (45 lb 12.8 oz)  Pregravid BMI: 22.15    BP: 123/77          Prenatal Labs:  Lab Results   Component Value Date    HGB 11.3 (L) 2024    HCT 34.4 (L) 2024     2024    ABO A 2024    LABRH NEG 2024    ABID ANTI-D  10/31/2019    NEISSGONOAMP Negative 01/24/2024    CHLAMTRACAMP Negative 01/24/2024    SYPHT Nonreactive 02/22/2024    HEPBSAG Nonreactive 02/22/2024    HIV1X2 Nonreactive 02/22/2024    URINECULTURE No growth 01/24/2024       Assessment/Plan   IOL handout provided for review  Follow up in 1 week for a routine prenatal visit.    Josefina Kyle, OLIVIA-KRISTENM

## 2024-08-13 ENCOUNTER — APPOINTMENT (OUTPATIENT)
Dept: OBSTETRICS AND GYNECOLOGY | Facility: CLINIC | Age: 24
End: 2024-08-13
Payer: COMMERCIAL

## 2024-08-13 VITALS — DIASTOLIC BLOOD PRESSURE: 79 MMHG | BODY MASS INDEX: 30.47 KG/M2 | WEIGHT: 172 LBS | SYSTOLIC BLOOD PRESSURE: 121 MMHG

## 2024-08-13 DIAGNOSIS — Z3A.39 39 WEEKS GESTATION OF PREGNANCY (HHS-HCC): Primary | ICD-10-CM

## 2024-08-13 DIAGNOSIS — Z34.80 SUPERVISION OF OTHER NORMAL PREGNANCY, ANTEPARTUM (HHS-HCC): ICD-10-CM

## 2024-08-13 PROCEDURE — 99213 OFFICE O/P EST LOW 20 MIN: CPT | Performed by: ADVANCED PRACTICE MIDWIFE

## 2024-08-13 NOTE — PROGRESS NOTES
Return OB visit    S: Dotty Bojorquez is a 24 y.o.  at 39w0d with a working estimated date of delivery of 2024, by Last Menstrual Period who presents for a routine prenatal visit. She denies vaginal bleeding, abdominal pain, leakage of fluid.     Concerns today: None    O: See prenatal flow sheet    A/P:  Membrane sweep per pt request  Reviewed warning signs and when to call midwife  IOL 8 am tomorrow

## 2024-08-14 ENCOUNTER — ANESTHESIA EVENT (OUTPATIENT)
Dept: OBSTETRICS AND GYNECOLOGY | Facility: HOSPITAL | Age: 24
End: 2024-08-14
Payer: COMMERCIAL

## 2024-08-14 ENCOUNTER — ANESTHESIA (OUTPATIENT)
Dept: OBSTETRICS AND GYNECOLOGY | Facility: HOSPITAL | Age: 24
End: 2024-08-14
Payer: COMMERCIAL

## 2024-08-14 ENCOUNTER — APPOINTMENT (OUTPATIENT)
Dept: OBSTETRICS AND GYNECOLOGY | Facility: HOSPITAL | Age: 24
End: 2024-08-14
Payer: COMMERCIAL

## 2024-08-14 ENCOUNTER — HOSPITAL ENCOUNTER (INPATIENT)
Facility: HOSPITAL | Age: 24
LOS: 1 days | Discharge: HOME | End: 2024-08-15
Attending: OBSTETRICS & GYNECOLOGY | Admitting: OBSTETRICS & GYNECOLOGY
Payer: COMMERCIAL

## 2024-08-14 DIAGNOSIS — Z3A.39 39 WEEKS GESTATION OF PREGNANCY (HHS-HCC): ICD-10-CM

## 2024-08-14 PROBLEM — Z34.90 ENCOUNTER FOR INDUCTION OF LABOR (HHS-HCC): Status: RESOLVED | Noted: 2024-08-14 | Resolved: 2024-08-14

## 2024-08-14 PROBLEM — Z34.90 ENCOUNTER FOR INDUCTION OF LABOR (HHS-HCC): Status: ACTIVE | Noted: 2024-08-14

## 2024-08-14 LAB
ABO GROUP (TYPE) IN BLOOD: NORMAL
ANTIBODY SCREEN: NORMAL
ERYTHROCYTE [DISTWIDTH] IN BLOOD BY AUTOMATED COUNT: 13.4 % (ref 11.5–14.5)
HCT VFR BLD AUTO: 31.7 % (ref 36–46)
HGB BLD-MCNC: 10.2 G/DL (ref 12–16)
MCH RBC QN AUTO: 27.9 PG (ref 26–34)
MCHC RBC AUTO-ENTMCNC: 32.2 G/DL (ref 32–36)
MCV RBC AUTO: 87 FL (ref 80–100)
NRBC BLD-RTO: 0 /100 WBCS (ref 0–0)
PLATELET # BLD AUTO: 201 X10*3/UL (ref 150–450)
RBC # BLD AUTO: 3.65 X10*6/UL (ref 4–5.2)
RH FACTOR (ANTIGEN D): NORMAL
TREPONEMA PALLIDUM IGG+IGM AB [PRESENCE] IN SERUM OR PLASMA BY IMMUNOASSAY: NONREACTIVE
WBC # BLD AUTO: 11 X10*3/UL (ref 4.4–11.3)

## 2024-08-14 PROCEDURE — 36415 COLL VENOUS BLD VENIPUNCTURE: CPT | Performed by: ADVANCED PRACTICE MIDWIFE

## 2024-08-14 PROCEDURE — 7210000002 HC LABOR PER HOUR

## 2024-08-14 PROCEDURE — 2500000004 HC RX 250 GENERAL PHARMACY W/ HCPCS (ALT 636 FOR OP/ED): Performed by: ADVANCED PRACTICE MIDWIFE

## 2024-08-14 PROCEDURE — 2500000004 HC RX 250 GENERAL PHARMACY W/ HCPCS (ALT 636 FOR OP/ED): Performed by: NURSE ANESTHETIST, CERTIFIED REGISTERED

## 2024-08-14 PROCEDURE — 59050 FETAL MONITOR W/REPORT: CPT

## 2024-08-14 PROCEDURE — 2500000001 HC RX 250 WO HCPCS SELF ADMINISTERED DRUGS (ALT 637 FOR MEDICARE OP): Performed by: ADVANCED PRACTICE MIDWIFE

## 2024-08-14 PROCEDURE — 85027 COMPLETE CBC AUTOMATED: CPT | Performed by: ADVANCED PRACTICE MIDWIFE

## 2024-08-14 PROCEDURE — 51702 INSERT TEMP BLADDER CATH: CPT

## 2024-08-14 PROCEDURE — 7100000016 HC LABOR RECOVERY PER HOUR

## 2024-08-14 PROCEDURE — 59409 OBSTETRICAL CARE: CPT | Performed by: ADVANCED PRACTICE MIDWIFE

## 2024-08-14 PROCEDURE — 3E033VJ INTRODUCTION OF OTHER HORMONE INTO PERIPHERAL VEIN, PERCUTANEOUS APPROACH: ICD-10-PCS | Performed by: ADVANCED PRACTICE MIDWIFE

## 2024-08-14 PROCEDURE — 10907ZC DRAINAGE OF AMNIOTIC FLUID, THERAPEUTIC FROM PRODUCTS OF CONCEPTION, VIA NATURAL OR ARTIFICIAL OPENING: ICD-10-PCS | Performed by: ADVANCED PRACTICE MIDWIFE

## 2024-08-14 PROCEDURE — 1220000001 HC OB SEMI-PRIVATE ROOM DAILY

## 2024-08-14 PROCEDURE — 86901 BLOOD TYPING SEROLOGIC RH(D): CPT | Performed by: ADVANCED PRACTICE MIDWIFE

## 2024-08-14 PROCEDURE — 86922 COMPATIBILITY TEST ANTIGLOB: CPT

## 2024-08-14 PROCEDURE — 2500000005 HC RX 250 GENERAL PHARMACY W/O HCPCS: Performed by: NURSE ANESTHETIST, CERTIFIED REGISTERED

## 2024-08-14 PROCEDURE — 59410 OBSTETRICAL CARE: CPT | Performed by: ADVANCED PRACTICE MIDWIFE

## 2024-08-14 PROCEDURE — 86780 TREPONEMA PALLIDUM: CPT | Mod: STJLAB | Performed by: ADVANCED PRACTICE MIDWIFE

## 2024-08-14 PROCEDURE — 3700000014 EPIDURAL BLOCK: Performed by: NURSE ANESTHETIST, CERTIFIED REGISTERED

## 2024-08-14 RX ORDER — FENTANYL/ROPIVACAINE/NS/PF 2MCG/ML-.2
0-25 PLASTIC BAG, INJECTION (ML) INJECTION CONTINUOUS
Status: DISCONTINUED | OUTPATIENT
Start: 2024-08-14 | End: 2024-08-14

## 2024-08-14 RX ORDER — DEXMEDETOMIDINE IN 0.9 % NACL 20 MCG/5ML
SYRINGE (ML) INTRAVENOUS AS NEEDED
Status: DISCONTINUED | OUTPATIENT
Start: 2024-08-14 | End: 2024-08-14

## 2024-08-14 RX ORDER — POLYETHYLENE GLYCOL 3350 17 G/17G
17 POWDER, FOR SOLUTION ORAL 2 TIMES DAILY PRN
Status: DISCONTINUED | OUTPATIENT
Start: 2024-08-14 | End: 2024-08-16 | Stop reason: HOSPADM

## 2024-08-14 RX ORDER — SODIUM CHLORIDE, SODIUM LACTATE, POTASSIUM CHLORIDE, CALCIUM CHLORIDE 600; 310; 30; 20 MG/100ML; MG/100ML; MG/100ML; MG/100ML
125 INJECTION, SOLUTION INTRAVENOUS CONTINUOUS
Status: DISCONTINUED | OUTPATIENT
Start: 2024-08-14 | End: 2024-08-14

## 2024-08-14 RX ORDER — LIDOCAINE HCL/EPINEPHRINE/PF 2%-1:200K
VIAL (ML) INJECTION AS NEEDED
Status: DISCONTINUED | OUTPATIENT
Start: 2024-08-14 | End: 2024-08-14

## 2024-08-14 RX ORDER — CARBOPROST TROMETHAMINE 250 UG/ML
250 INJECTION, SOLUTION INTRAMUSCULAR ONCE AS NEEDED
Status: DISCONTINUED | OUTPATIENT
Start: 2024-08-14 | End: 2024-08-16 | Stop reason: HOSPADM

## 2024-08-14 RX ORDER — DIPHENHYDRAMINE HYDROCHLORIDE 50 MG/ML
25 INJECTION INTRAMUSCULAR; INTRAVENOUS EVERY 6 HOURS PRN
Status: DISCONTINUED | OUTPATIENT
Start: 2024-08-14 | End: 2024-08-16 | Stop reason: HOSPADM

## 2024-08-14 RX ORDER — ONDANSETRON HYDROCHLORIDE 2 MG/ML
4 INJECTION, SOLUTION INTRAVENOUS EVERY 6 HOURS PRN
Status: DISCONTINUED | OUTPATIENT
Start: 2024-08-14 | End: 2024-08-14

## 2024-08-14 RX ORDER — NIFEDIPINE 10 MG/1
10 CAPSULE ORAL ONCE AS NEEDED
Status: DISCONTINUED | OUTPATIENT
Start: 2024-08-14 | End: 2024-08-14

## 2024-08-14 RX ORDER — ADHESIVE BANDAGE
10 BANDAGE TOPICAL
Status: DISCONTINUED | OUTPATIENT
Start: 2024-08-14 | End: 2024-08-16 | Stop reason: HOSPADM

## 2024-08-14 RX ORDER — HYDRALAZINE HYDROCHLORIDE 20 MG/ML
5 INJECTION INTRAMUSCULAR; INTRAVENOUS ONCE AS NEEDED
Status: DISCONTINUED | OUTPATIENT
Start: 2024-08-14 | End: 2024-08-14

## 2024-08-14 RX ORDER — LABETALOL HYDROCHLORIDE 5 MG/ML
20 INJECTION, SOLUTION INTRAVENOUS ONCE AS NEEDED
Status: DISCONTINUED | OUTPATIENT
Start: 2024-08-14 | End: 2024-08-14

## 2024-08-14 RX ORDER — LIDOCAINE 560 MG/1
1 PATCH PERCUTANEOUS; TOPICAL; TRANSDERMAL
Status: DISCONTINUED | OUTPATIENT
Start: 2024-08-14 | End: 2024-08-16 | Stop reason: HOSPADM

## 2024-08-14 RX ORDER — BISACODYL 10 MG/1
10 SUPPOSITORY RECTAL DAILY PRN
Status: DISCONTINUED | OUTPATIENT
Start: 2024-08-14 | End: 2024-08-16 | Stop reason: HOSPADM

## 2024-08-14 RX ORDER — LOPERAMIDE HYDROCHLORIDE 2 MG/1
4 CAPSULE ORAL EVERY 2 HOUR PRN
Status: DISCONTINUED | OUTPATIENT
Start: 2024-08-14 | End: 2024-08-14

## 2024-08-14 RX ORDER — METHYLERGONOVINE MALEATE 0.2 MG/ML
0.2 INJECTION INTRAVENOUS ONCE AS NEEDED
Status: DISCONTINUED | OUTPATIENT
Start: 2024-08-14 | End: 2024-08-16 | Stop reason: HOSPADM

## 2024-08-14 RX ORDER — OXYTOCIN/0.9 % SODIUM CHLORIDE 30/500 ML
60 PLASTIC BAG, INJECTION (ML) INTRAVENOUS ONCE AS NEEDED
Status: DISCONTINUED | OUTPATIENT
Start: 2024-08-14 | End: 2024-08-14

## 2024-08-14 RX ORDER — NORETHINDRONE AND ETHINYL ESTRADIOL 0.5-0.035
KIT ORAL AS NEEDED
Status: DISCONTINUED | OUTPATIENT
Start: 2024-08-14 | End: 2024-08-14

## 2024-08-14 RX ORDER — TRANEXAMIC ACID 100 MG/ML
1000 INJECTION, SOLUTION INTRAVENOUS ONCE AS NEEDED
Status: DISCONTINUED | OUTPATIENT
Start: 2024-08-14 | End: 2024-08-16 | Stop reason: HOSPADM

## 2024-08-14 RX ORDER — SERTRALINE HYDROCHLORIDE 50 MG/1
50 TABLET, FILM COATED ORAL DAILY
Status: DISCONTINUED | OUTPATIENT
Start: 2024-08-14 | End: 2024-08-16 | Stop reason: HOSPADM

## 2024-08-14 RX ORDER — OXYTOCIN 10 [USP'U]/ML
10 INJECTION, SOLUTION INTRAMUSCULAR; INTRAVENOUS ONCE AS NEEDED
Status: DISCONTINUED | OUTPATIENT
Start: 2024-08-14 | End: 2024-08-14

## 2024-08-14 RX ORDER — TERBUTALINE SULFATE 1 MG/ML
0.25 INJECTION SUBCUTANEOUS ONCE AS NEEDED
Status: DISCONTINUED | OUTPATIENT
Start: 2024-08-14 | End: 2024-08-14

## 2024-08-14 RX ORDER — LABETALOL HYDROCHLORIDE 5 MG/ML
20 INJECTION, SOLUTION INTRAVENOUS ONCE AS NEEDED
Status: DISCONTINUED | OUTPATIENT
Start: 2024-08-14 | End: 2024-08-16 | Stop reason: HOSPADM

## 2024-08-14 RX ORDER — OXYTOCIN/0.9 % SODIUM CHLORIDE 30/500 ML
60 PLASTIC BAG, INJECTION (ML) INTRAVENOUS ONCE AS NEEDED
Status: DISCONTINUED | OUTPATIENT
Start: 2024-08-14 | End: 2024-08-16 | Stop reason: HOSPADM

## 2024-08-14 RX ORDER — TRANEXAMIC ACID 100 MG/ML
1000 INJECTION, SOLUTION INTRAVENOUS ONCE AS NEEDED
Status: DISCONTINUED | OUTPATIENT
Start: 2024-08-14 | End: 2024-08-14

## 2024-08-14 RX ORDER — METOCLOPRAMIDE HYDROCHLORIDE 5 MG/ML
10 INJECTION INTRAMUSCULAR; INTRAVENOUS EVERY 6 HOURS PRN
Status: DISCONTINUED | OUTPATIENT
Start: 2024-08-14 | End: 2024-08-14

## 2024-08-14 RX ORDER — MISOPROSTOL 200 UG/1
800 TABLET ORAL ONCE AS NEEDED
Status: DISCONTINUED | OUTPATIENT
Start: 2024-08-14 | End: 2024-08-14

## 2024-08-14 RX ORDER — ONDANSETRON 4 MG/1
4 TABLET, FILM COATED ORAL EVERY 6 HOURS PRN
Status: DISCONTINUED | OUTPATIENT
Start: 2024-08-14 | End: 2024-08-14

## 2024-08-14 RX ORDER — LOPERAMIDE HYDROCHLORIDE 2 MG/1
4 CAPSULE ORAL EVERY 2 HOUR PRN
Status: DISCONTINUED | OUTPATIENT
Start: 2024-08-14 | End: 2024-08-16 | Stop reason: HOSPADM

## 2024-08-14 RX ORDER — NIFEDIPINE 10 MG/1
10 CAPSULE ORAL ONCE AS NEEDED
Status: DISCONTINUED | OUTPATIENT
Start: 2024-08-14 | End: 2024-08-16 | Stop reason: HOSPADM

## 2024-08-14 RX ORDER — OXYTOCIN 10 [USP'U]/ML
10 INJECTION, SOLUTION INTRAMUSCULAR; INTRAVENOUS ONCE AS NEEDED
Status: DISCONTINUED | OUTPATIENT
Start: 2024-08-14 | End: 2024-08-16 | Stop reason: HOSPADM

## 2024-08-14 RX ORDER — IBUPROFEN 600 MG/1
600 TABLET ORAL EVERY 6 HOURS
Status: DISCONTINUED | OUTPATIENT
Start: 2024-08-14 | End: 2024-08-16 | Stop reason: HOSPADM

## 2024-08-14 RX ORDER — ONDANSETRON HYDROCHLORIDE 2 MG/ML
4 INJECTION, SOLUTION INTRAVENOUS EVERY 6 HOURS PRN
Status: DISCONTINUED | OUTPATIENT
Start: 2024-08-14 | End: 2024-08-16 | Stop reason: HOSPADM

## 2024-08-14 RX ORDER — DIPHENHYDRAMINE HCL 25 MG
25 TABLET ORAL EVERY 6 HOURS PRN
Status: DISCONTINUED | OUTPATIENT
Start: 2024-08-14 | End: 2024-08-16 | Stop reason: HOSPADM

## 2024-08-14 RX ORDER — MISOPROSTOL 200 UG/1
800 TABLET ORAL ONCE AS NEEDED
Status: DISCONTINUED | OUTPATIENT
Start: 2024-08-14 | End: 2024-08-16 | Stop reason: HOSPADM

## 2024-08-14 RX ORDER — OXYTOCIN/0.9 % SODIUM CHLORIDE 30/500 ML
2-30 PLASTIC BAG, INJECTION (ML) INTRAVENOUS CONTINUOUS
Status: DISCONTINUED | OUTPATIENT
Start: 2024-08-14 | End: 2024-08-14

## 2024-08-14 RX ORDER — METOCLOPRAMIDE 10 MG/1
10 TABLET ORAL EVERY 6 HOURS PRN
Status: DISCONTINUED | OUTPATIENT
Start: 2024-08-14 | End: 2024-08-14

## 2024-08-14 RX ORDER — LIDOCAINE HYDROCHLORIDE 10 MG/ML
30 INJECTION INFILTRATION; PERINEURAL ONCE AS NEEDED
Status: DISCONTINUED | OUTPATIENT
Start: 2024-08-14 | End: 2024-08-14

## 2024-08-14 RX ORDER — BUPROPION HYDROCHLORIDE 75 MG/1
75 TABLET ORAL DAILY
Status: DISCONTINUED | OUTPATIENT
Start: 2024-08-14 | End: 2024-08-16 | Stop reason: HOSPADM

## 2024-08-14 RX ORDER — METHYLERGONOVINE MALEATE 0.2 MG/ML
0.2 INJECTION INTRAVENOUS ONCE AS NEEDED
Status: DISCONTINUED | OUTPATIENT
Start: 2024-08-14 | End: 2024-08-14

## 2024-08-14 RX ORDER — CALCIUM CARBONATE 200(500)MG
500 TABLET,CHEWABLE ORAL DAILY
Status: DISCONTINUED | OUTPATIENT
Start: 2024-08-14 | End: 2024-08-14

## 2024-08-14 RX ORDER — ACETAMINOPHEN 325 MG/1
975 TABLET ORAL EVERY 6 HOURS
Status: DISCONTINUED | OUTPATIENT
Start: 2024-08-14 | End: 2024-08-16 | Stop reason: HOSPADM

## 2024-08-14 RX ORDER — SIMETHICONE 80 MG
80 TABLET,CHEWABLE ORAL 4 TIMES DAILY PRN
Status: DISCONTINUED | OUTPATIENT
Start: 2024-08-14 | End: 2024-08-16 | Stop reason: HOSPADM

## 2024-08-14 RX ORDER — ONDANSETRON 4 MG/1
4 TABLET, FILM COATED ORAL EVERY 6 HOURS PRN
Status: DISCONTINUED | OUTPATIENT
Start: 2024-08-14 | End: 2024-08-16 | Stop reason: HOSPADM

## 2024-08-14 RX ORDER — CARBOPROST TROMETHAMINE 250 UG/ML
250 INJECTION, SOLUTION INTRAMUSCULAR ONCE AS NEEDED
Status: DISCONTINUED | OUTPATIENT
Start: 2024-08-14 | End: 2024-08-14

## 2024-08-14 RX ORDER — HYDRALAZINE HYDROCHLORIDE 20 MG/ML
5 INJECTION INTRAMUSCULAR; INTRAVENOUS ONCE AS NEEDED
Status: DISCONTINUED | OUTPATIENT
Start: 2024-08-14 | End: 2024-08-16 | Stop reason: HOSPADM

## 2024-08-14 SDOH — HEALTH STABILITY: MENTAL HEALTH: WISH TO BE DEAD (PAST 1 MONTH): NO

## 2024-08-14 SDOH — SOCIAL STABILITY: SOCIAL INSECURITY: ARE THERE ANY APPARENT SIGNS OF INJURIES/BEHAVIORS THAT COULD BE RELATED TO ABUSE/NEGLECT?: NO

## 2024-08-14 SDOH — ECONOMIC STABILITY: HOUSING INSECURITY: DO YOU FEEL UNSAFE GOING BACK TO THE PLACE WHERE YOU ARE LIVING?: NO

## 2024-08-14 SDOH — SOCIAL STABILITY: SOCIAL INSECURITY: HAS ANYONE EVER THREATENED TO HURT YOUR FAMILY OR YOUR PETS?: NO

## 2024-08-14 SDOH — SOCIAL STABILITY: SOCIAL INSECURITY: ABUSE SCREEN: ADULT

## 2024-08-14 SDOH — SOCIAL STABILITY: SOCIAL INSECURITY: ARE YOU OR HAVE YOU BEEN THREATENED OR ABUSED PHYSICALLY, EMOTIONALLY, OR SEXUALLY BY ANYONE?: NO

## 2024-08-14 SDOH — SOCIAL STABILITY: SOCIAL INSECURITY: HAVE YOU HAD THOUGHTS OF HARMING ANYONE ELSE?: NO

## 2024-08-14 SDOH — HEALTH STABILITY: MENTAL HEALTH: WERE YOU ABLE TO COMPLETE ALL THE BEHAVIORAL HEALTH SCREENINGS?: YES

## 2024-08-14 SDOH — SOCIAL STABILITY: SOCIAL INSECURITY: DO YOU FEEL ANYONE HAS EXPLOITED OR TAKEN ADVANTAGE OF YOU FINANCIALLY OR OF YOUR PERSONAL PROPERTY?: NO

## 2024-08-14 SDOH — SOCIAL STABILITY: SOCIAL INSECURITY: VERBAL ABUSE: DENIES

## 2024-08-14 SDOH — HEALTH STABILITY: MENTAL HEALTH: NON-SPECIFIC ACTIVE SUICIDAL THOUGHTS (PAST 1 MONTH): NO

## 2024-08-14 SDOH — HEALTH STABILITY: MENTAL HEALTH: SUICIDAL BEHAVIOR (LIFETIME): NO

## 2024-08-14 SDOH — SOCIAL STABILITY: SOCIAL INSECURITY: PHYSICAL ABUSE: DENIES

## 2024-08-14 SDOH — SOCIAL STABILITY: SOCIAL INSECURITY: HAVE YOU HAD ANY THOUGHTS OF HARMING ANYONE ELSE?: NO

## 2024-08-14 SDOH — SOCIAL STABILITY: SOCIAL INSECURITY: DOES ANYONE TRY TO KEEP YOU FROM HAVING/CONTACTING OTHER FRIENDS OR DOING THINGS OUTSIDE YOUR HOME?: NO

## 2024-08-14 ASSESSMENT — PAIN SCALES - GENERAL
PAINLEVEL_OUTOF10: 2
PAINLEVEL_OUTOF10: 2
PAINLEVEL_OUTOF10: 0 - NO PAIN
PAINLEVEL_OUTOF10: 0 - NO PAIN
PAINLEVEL_OUTOF10: 3
PAINLEVEL_OUTOF10: 0 - NO PAIN
PAINLEVEL_OUTOF10: 3
PAINLEVEL_OUTOF10: 9
PAINLEVEL_OUTOF10: 0 - NO PAIN
PAINLEVEL_OUTOF10: 2
PAINLEVEL_OUTOF10: 3

## 2024-08-14 ASSESSMENT — PATIENT HEALTH QUESTIONNAIRE - PHQ9
2. FEELING DOWN, DEPRESSED OR HOPELESS: NOT AT ALL
1. LITTLE INTEREST OR PLEASURE IN DOING THINGS: NOT AT ALL
SUM OF ALL RESPONSES TO PHQ9 QUESTIONS 1 & 2: 0

## 2024-08-14 ASSESSMENT — LIFESTYLE VARIABLES
HOW OFTEN DO YOU HAVE A DRINK CONTAINING ALCOHOL: NEVER
AUDIT-C TOTAL SCORE: 0
SKIP TO QUESTIONS 9-10: 1
AUDIT-C TOTAL SCORE: 0
HOW MANY STANDARD DRINKS CONTAINING ALCOHOL DO YOU HAVE ON A TYPICAL DAY: PATIENT DOES NOT DRINK
HOW OFTEN DO YOU HAVE 6 OR MORE DRINKS ON ONE OCCASION: NEVER

## 2024-08-14 ASSESSMENT — ACTIVITIES OF DAILY LIVING (ADL): LACK_OF_TRANSPORTATION: NO

## 2024-08-14 NOTE — ANESTHESIA PREPROCEDURE EVALUATION
Patient: Dotty Bojorquez    Evaluation Method: In-person visit    Procedure Information    Date: 08/14/24  Procedure: Labor Consult         Relevant Problems   Cardiac   (+) Breast pain, left      Neuro   (+) Depression affecting pregnancy ([facility])   (+) Depressive disorder in mother affecting pregnancy ([facility])      Hematology   (+) Anemia      GYN   (+) Supervision of other normal pregnancy, antepartum ([facility])       Clinical information reviewed:    Allergies  Meds  Problems              NPO Detail:  No data recorded     OB/Gyn Evaluation    Present Pregnancy    Patient is pregnant now.   Obstetric History                Physical Exam    Airway  Mallampati: II     Cardiovascular - normal exam     Dental - normal exam     Pulmonary - normal exam     Abdominal - normal exam             Anesthesia Plan    History of general anesthesia?: yes  History of complications of general anesthesia?: no    ASA 2     epidural   (I informed and discussed the risks and benefits of general, spinal and epidural anesthesia with the patient.  The patient expressed her understanding and her questions were answered.  A verbal consent was given by the patient.   )  Anesthetic plan and risks discussed with patient and spouse.  Use of blood products discussed with patient and spouse who consented to blood products.    Plan discussed with CRNA.

## 2024-08-14 NOTE — HOSPITAL COURSE
25yo  @ 39.1 wks  RR IOL, declined CRB  Pitocin started at 10 am    Hgb 10.2,  T&C x1 unit    1305:  AROM 3/70/-2  1352:  *1st mild range /82*    Epidural      @ 1702, intact,  mls  Boy, breast

## 2024-08-14 NOTE — H&P
Obstetrical Admission History and Physical     Dotty Bojorquez is a 24 y.o.  at 39w1d. RANDY: 2024, by Last Menstrual Period. Estimated fetal weight: 8#s. She has had prenatal care with MONI Araujo .    Chief Complaint: Induction of labor    Assessment/Plan    25yo  @ 39.1 wks  RR IOL with Pitocin, declines CRB  cEFM  Clear liquid diet  Epidural when desired  Cat 1 Fetal heart tracing  Depression  On Zoloft 50 mg    Assessment & Plan  Encounter for induction of labor ([facility])        Pregnancy Problems (from 24 to present)       Problem Noted Resolved    Encounter for induction of labor ([facility]) 2024 by DAPHNEY Espinal No    Priority:  Medium      Supervision of other normal pregnancy, antepartum ([facility]) 2024 by DAPHNEY Velasquez No    Priority:  Medium      Overview Addendum 2024 12:01 PM by DAPHNEY David     Declines Covid/flu vaccine.  Normal rr NIPS, it's a BOY!    Hx of breast augmentation  -- States that the implant in the left breast adhered to the muscle, and the provider was supposed to repair it, though then lost her medical license, so she has not had this repaired yet... Referral to breast surgery and specialty placed.     Rh NEG -- For Rhogam 28 weeks.     Wants 39 week IOL --  @ 0800.  Epidural.         Depression affecting pregnancy ([facility]) 2024 by DAPHNEY Velasquez No    Priority:  Medium      Overview Addendum 7/3/2024  3:16 PM by DAPHNEY Velasquez     EPDS 15 on 24.  Referral placed to MAHI Deustch -- Appt on .  Zoloft Rx 50 mg sent to pharmacy 24.    Reviewed visit with MAHI Deutsch -- Wellbutrin 75 mg; continue Zoloft 50 mg every AM.          Depressive disorder in mother affecting pregnancy ([facility]) 2024 by Wilfredo Reza, RN No    Priority:  Medium            Options for delivery have been discussed with the patient and she elects for an induction of labor.  Cervical  ripening with cytotec, cervidil, other prostaglandin agents has been discussed.  Induction of labor with pitocin, amniotomy, cytotec, and cervical balloon have been discussed in detail. The risks, benefits, complications, alternatives, expected outcomes, potential problems during recuperation and recovery, and the risks of not performing the procedure were discussed with the patient. The patient stated understanding that the risks of delivery include, but are not limited to: death; reaction to medications; injury to bowel, bladder, ureters, uterus, cervix, vagina, and other pelvic and abdominal structures, infection; blood loss and possible need for transfusion; and potential need for surgery, including hysterectomy. The risks of injury to the infant during delivery were also discussed. All questions were answered. There was concurrence with the planned procedure, and the patient wanted to proceed.    Admit to inpatient status. I anticipate that this patient will require a stay exceeding at least 2 midnights for delivery and postpartum.  Induction of labor.  Management of pregnancy complications, as indicated.    Subjective   Good fetal movement. Denies vaginal bleeding., Denies contractions., Denies leaking of fluid.       Reason for Induction of Labor:  Pregnancy at 39 weeks or greater for induction     Obstetrical History   OB History    Para Term  AB Living   4 1 1 0 2 1   SAB IAB Ectopic Multiple Live Births   1 1 0 0 1      # Outcome Date GA Lbr Jeff/2nd Weight Sex Type Anes PTL Lv   4 Current            3 Term 17 39w0d   F Vag-Spont EPI  FLIP   2 IAB            1 SAB                Past Medical History  Past Medical History:   Diagnosis Date    Abnormal Pap smear of cervix     Cellulitis of face 10/24/2023    Chlamydia     Encntr screen for infections w sexl mode of transmiss 08/15/2017    Excessive and frequent menstruation with regular cycle 2022    Spotting    H/O breast  augmentation 2021    Injury of finger of right hand 07/19/2024    Insomnia 07/19/2024    Personal history of other infectious and parasitic diseases 10/18/2019    History of viral infection    Seasonal allergies     Spotting 07/19/2024    Tension headache 07/19/2024    Urogenital trichomoniasis     Vaginitis 06/18/2024        Past Surgical History   Past Surgical History:   Procedure Laterality Date    OTHER SURGICAL HISTORY  08/20/2019    Tonsillectomy with adenoidectomy    CO BREAST AUGMENTATION WITH IMPLANT  Spring 2021       Social History  Social History     Tobacco Use    Smoking status: Never    Smokeless tobacco: Never   Substance Use Topics    Alcohol use: Not Currently     Substance and Sexual Activity   Drug Use Never       Allergies  Doxycycline, Methylphenidate, and Zonisamide     Medications  Medications Prior to Admission   Medication Sig Dispense Refill Last Dose    buPROPion (Wellbutrin) 75 mg tablet Take 1 tablet (75 mg) by mouth once daily. 90 tablet 0     metoclopramide (Reglan) 10 mg tablet Take by mouth.       omeprazole OTC (PriLOSEC OTC) 20 mg EC tablet Take 1 tablet (20 mg) by mouth once daily in the morning. Take before meals. Do not crush, chew, or split.       ondansetron (Zofran) 4 mg tablet Take 1 tablet (4 mg) by mouth every 8 hours if needed for nausea or vomiting. 30 tablet 1     prenatal19-iron-folic-omega3 29-1-400 mg combo pack,tablet and cap,DR Take by mouth.       prenatal72-iron fum-FA-om3-dha (Prenatal Plus DHA) 27 mg iron-1 mg -312 mg-250 mg combo pack Take by mouth.       sertraline (Zoloft) 50 mg tablet Take 1 tablet (50 mg) by mouth once daily. 30 tablet 11        Objective    Last Vitals  Temp Pulse Resp BP MAP O2 Sat   36.9 °C (98.4 °F) 102 (manual) 16 127/84 (Simultaneous filing. User may not have seen previous data.) 100 99 %     Physical Examination  GENERAL: Examination reveals a well developed, well nourished, gravid female in no acute distress. She is alert and  "cooperative.  HEENT: External ears normal. Nose normal, no erythema or discharge. Mouth and throat clear.  LUNGS:  Normal effort  FHR is 145 , with Accelerations, and a Category I tracing.    [place] reading:  No contractions  The fetus is in a vertex presentation, determined by ultrasound  CERVIX: 2  cm dilated, 50  % effaced,  -2 station (per exam by BLAYNE yesterday in the office)   MEMBRANES are Intact    Lab Review  No results found for: \"ABO\", \"LABRH\", \"ABSCRN\"  No results found for: \"WBC\", \"HGB\", \"HCT\", \"PLT\"  0   Lab Value Date/Time    GRPBSTREP No Group B Streptococcus (GBS) isolated 07/26/2024 1157       "

## 2024-08-14 NOTE — L&D DELIVERY NOTE
OB Delivery Note  2024  Dotty Bojorquez  24 y.o.   Vaginal, Spontaneous       Gestational Age: 39w1d  /Para:   Quantitative Blood Loss: Admission to Discharge: 325 mL (2024  7:36 AM - 2024  7:30 PM)    Alejandrina Bojorquez [[id number]]      Labor Events    Sac identifier: Sac 1  Labor type: Induced Onset of Labor  Labor allowed to proceed with plans for an attempted vaginal birth?: Yes  Induction: Oxytocin, AROM  Induction indications: Risk Reducing  Complications: None       Placenta    Placenta delivery date/time: 2024 170  Placenta removal: Spontaneous  Placenta appearance: Intact  Placenta disposition: discarded       Cord    Vessels: 3 vessels  Complications: None  Delayed cord clamping?: Yes  Cord blood disposition: Lab  Gases sent?: No  Stem cell collection (by provider): No       Lacerations    Episiotomy: None  Perineal laceration: None  Other lacerations?: No  Repair suture: None       Anesthesia    Method: Epidural       Operative Delivery    Forceps attempted?: No  Vacuum extractor attempted?: No       Shoulder Dystocia    Shoulder dystocia present?: No       [place] Delivery    Birth date/time: 2024 17:02:00  Delivery type: Vaginal, Spontaneous  Complications: None       Resuscitation    Method: Suctioning, Tactile stimulation       Apgars    Living status: Living  Apgar Component Scores:  1 min.:  5 min.:  10 min.:  15 min.:  20 min.:    Skin color:  1  1       Heart rate:  2  2       Reflex irritability:  2  2       Muscle tone:  2  2       Respiratory effort:  2  2       Total:  9  9              Delivery Providers    Delivering clinician: DAPHNEY Espinal   Provider Role    Memo Dow RN Delivery Nurse    Fabiana Bethea RN Nursery Nurse     Resident                     DAPHNEY Espinal

## 2024-08-14 NOTE — CARE PLAN
The patient's goals for the shift include Have a baby    The clinical goals for the shift include FHR to remain wdl    Problem: Pain - Adult  Goal: Verbalizes/displays adequate comfort level or baseline comfort level  2024 by Memo Dow RN  Outcome: Progressing  2024 by Memo Dow RN  Flowsheets (Taken 2024)  Verbalizes/displays adequate comfort level or baseline comfort level:   Encourage patient to monitor pain and request assistance   Assess pain using appropriate pain scale   Administer analgesics based on type and severity of pain and evaluate response     Problem: Safety - Adult  Goal: Free from fall injury  2024 by Memo Dow RN  Outcome: Progressing  2024 by Memo Dow RN  Flowsheets (Taken 2024)  Free from fall injury:   Instruct family/caregiver on patient safety   Based on caregiver fall risk screen, instruct family/caregiver to ask for assistance with transferring infant if caregiver noted to have fall risk factors     Problem: Discharge Planning  Goal: Discharge to home or other facility with appropriate resources  2024 by Memo Dow RN  Outcome: Progressing  2024 by Memo Dow RN  Flowsheets (Taken 2024)  Discharge to home or other facility with appropriate resources:   Identify barriers to discharge with patient and caregiver   Arrange for needed discharge resources and transportation as appropriate     Problem: Vaginal Birth or  Section  Goal: Fetal and maternal status remain reassuring during the birth process  Outcome: Met  Flowsheets (Taken 2024)  Fetal and maternal status remain reassuring during the birth process:   Monitor vital signs   Monitor fetal heart rate   Monitor uterine activity   Monitor labor progression (Vaginal delivery)  Goal: Prevention of malpresentation/labor dystocia through delivery  Outcome: Met  Flowsheets (Taken 2024  1733)  Prevention of malpresentation/labor dystocia through delivery: Positioning, turning, and/or use of birthing ball assistance  Goal: Demonstrates labor coping techniques through delivery  Outcome: Met  Flowsheets (Taken 8/14/2024 1733)  Demonstrates labor coping techniques through delivery:   Positioning, turning, and/or use of birthing ball assistance   Breathing/relaxation assistance

## 2024-08-14 NOTE — PROGRESS NOTES
Pt reports feeling rectal pressure, found to be complete, 0 station. FHR cat 1, VSS  Will prepare for delivery, Dr Davis updated   Anticipate

## 2024-08-14 NOTE — PROGRESS NOTES
S: Starting to feel contractions, not painful    O: Visit Vitals  /80   Pulse 91   Temp 36.6 °C (97.9 °F) (Oral)   Resp 17      FHR: 135 BPM, moderate variability, +accels, no decels  TOCO: contractions 2-3 mins  VE: 3/70/-2  Membranes: AROM for clear fluid    A: 24 y.o.  39w1d  latent labor  Cat 1 tracing  GBS negative    P: Continue Pitocin  cEFM  Diet: clear  Frequent position changes  Dr. Davis updated  Anticipate        Total Volume (Ccs): 1 Detail Level: None Concentration (Mg/Ml) Of Additional Medication: 2.5 Concentration (Mg/Ml): 40.0 Kenalog Preparation: kenalog Add Option For Additional Mediation: No Consent: The risks of atrophy were reviewed with the patient.

## 2024-08-14 NOTE — ANESTHESIA PREPROCEDURE EVALUATION
Patient: Dotty Bojorquez    Evaluation Method: In-person visit    Procedure Information    Date: 08/14/24  Procedure: Labor Consult         Relevant Problems   Cardiac   (+) Breast pain, left      Neuro   (+) Depression affecting pregnancy ([facility])   (+) Depressive disorder in mother affecting pregnancy ([facility])      Hematology   (+) Anemia      GYN   (+) Supervision of other normal pregnancy, antepartum ([facility])       Clinical information reviewed:    Allergies  Meds  Problems              NPO Detail:  No data recorded     OB/Gyn Evaluation    Present Pregnancy    Patient is pregnant now.   Obstetric History                Physical Exam    Airway  Mallampati: II     Cardiovascular - normal exam     Dental - normal exam     Pulmonary - normal exam     Abdominal - normal exam             Anesthesia Plan    History of general anesthesia?: yes  History of complications of general anesthesia?: no    ASA 2     epidural   (I informed and discussed the risks and benefits of general, spinal and epidural anesthesia with the patient.  The patient expressed her understanding and her questions were answered.  A verbal consent was given by the patient.   )  Anesthetic plan and risks discussed with patient and spouse.  Use of blood products discussed with patient and spouse who consented to blood products.    Plan discussed with CRNA.

## 2024-08-14 NOTE — ANESTHESIA PROCEDURE NOTES
Epidural Block    Patient location during procedure: OB  Start time: 8/14/2024 1:38 PM  End time: 8/14/2024 1:52 PM  Reason for block: labor analgesia  Staffing  Performed: CRNA   Authorized by: SOPHIA Nunez    Performed by: SOPHIA Nunez    Preanesthetic Checklist  Completed: patient identified, IV checked, risks and benefits discussed, surgical consent, monitors and equipment checked, pre-op evaluation, timeout performed and sterile techniques followed  Block Timeout  RN/Licensed healthcare professional reads aloud to the Anesthesia provider and entire team: Patient identity, procedure with side and site, patient position, and as applicable the availability of implants/special equipment/special requirements.  Patient on coagulant treatment: no  Timeout performed at: 8/14/2024 1:38 PM  Block Placement  Patient position: sitting  Prep: ChloraPrep  Sterility prep: cap, gloves and mask  Sedation level: no sedation  Patient monitoring: blood pressure and continuous pulse oximetry  Approach: midline  Local numbing: lidocaine 1% to skin and subcutaneous tissues  Vertebral space: lumbar  Lumbar location: L3-L4  Epidural  Loss of resistance technique: saline  Guidance: landmark technique        Needle  Needle type: Tuohy   Needle gauge: 17  Needle length: 8.9cm  Needle insertion depth: 4 cm  Catheter type: multi-orifice  Catheter size: 19 G  Catheter at skin depth: 11 cm  Catheter securement method: clear occlusive dressing and surgical tape    Test dose: lidocaine 1.5% with epinephrine 1-to-200,000  Test dose: lidocaine 1.5% with epinephrine 1-to-200,000  Test dose result: no positive test dose    PCEA  Medication concentration used: 0.2% Ropivacaine with 2 mcg/mL Fentanyl  Dose (mL): 5  Lockout (minutes): 30  1-Hour Limit (boluses/hr): 10  Basal Rate: 8        Assessment  Sensory level: T6 bilateral  Block outcome: patient comfortable  Number of attempts: 1  Events: no positive test  dose  Procedure assessment: patient tolerated procedure well with no immediate complications  Additional Notes  Epidural placement uneventful.  Ropivacaine with fentanyl infusion started @ 8mL/hr with a 5mL bolus

## 2024-08-15 ENCOUNTER — APPOINTMENT (OUTPATIENT)
Dept: OBSTETRICS AND GYNECOLOGY | Facility: CLINIC | Age: 24
End: 2024-08-15
Payer: COMMERCIAL

## 2024-08-15 VITALS
SYSTOLIC BLOOD PRESSURE: 117 MMHG | BODY MASS INDEX: 30.41 KG/M2 | HEIGHT: 63 IN | DIASTOLIC BLOOD PRESSURE: 83 MMHG | TEMPERATURE: 98.2 F | WEIGHT: 171.63 LBS | OXYGEN SATURATION: 98 % | RESPIRATION RATE: 18 BRPM | HEART RATE: 83 BPM

## 2024-08-15 LAB
FETAL MATERNAL SCREEN: NORMAL
HOLD SPECIMEN: NORMAL
HOLD SPECIMEN: NORMAL

## 2024-08-15 PROCEDURE — 85461 HEMOGLOBIN FETAL: CPT

## 2024-08-15 PROCEDURE — 99238 HOSP IP/OBS DSCHRG MGMT 30/<: CPT | Performed by: ADVANCED PRACTICE MIDWIFE

## 2024-08-15 PROCEDURE — 2500000004 HC RX 250 GENERAL PHARMACY W/ HCPCS (ALT 636 FOR OP/ED)

## 2024-08-15 PROCEDURE — 2500000001 HC RX 250 WO HCPCS SELF ADMINISTERED DRUGS (ALT 637 FOR MEDICARE OP): Performed by: ADVANCED PRACTICE MIDWIFE

## 2024-08-15 PROCEDURE — 1220000001 HC OB SEMI-PRIVATE ROOM DAILY

## 2024-08-15 PROCEDURE — 36415 COLL VENOUS BLD VENIPUNCTURE: CPT

## 2024-08-15 PROCEDURE — 2500000002 HC RX 250 W HCPCS SELF ADMINISTERED DRUGS (ALT 637 FOR MEDICARE OP, ALT 636 FOR OP/ED): Performed by: ADVANCED PRACTICE MIDWIFE

## 2024-08-15 ASSESSMENT — PAIN SCALES - GENERAL
PAINLEVEL_OUTOF10: 0 - NO PAIN
PAIN_LEVEL: 0
PAINLEVEL_OUTOF10: 6
PAINLEVEL_OUTOF10: 4
PAINLEVEL_OUTOF10: 1

## 2024-08-15 ASSESSMENT — PAIN DESCRIPTION - LOCATION: LOCATION: ABDOMEN

## 2024-08-15 NOTE — NURSING NOTE
Infant discharge instructions reviewed-questions answered-parents have follow up appt with [facility]-ID verified with both parents-pt is packing up her belongings-

## 2024-08-15 NOTE — DISCHARGE SUMMARY
Discharge Summary    Admission Date: 2024  Discharge Date: 8/15/24    Discharge Diagnosis  Encounter for induction of labor ([facility])    Hospital Course  Delivery Date: 2024 5:02 PM  Delivery type: Vaginal, Spontaneous   GA at delivery: 39w1d  Outcome: Living  Anesthesia during delivery: Epidural  Intrapartum complications: None  Feeding method: Breastfeeding Status: Yes     Procedures:  Induction of labor with epidural,   Contraception at discharge: none    Pertinent Physical Exam At Time of Discharge  See daily progress notes    Last Vitals:  Temp Pulse Resp BP MAP Pulse Ox   36.7 °C (98.1 °F) 95 20 108/70 72 98 %     Discharge Meds     Your medication list        CONTINUE taking these medications        Instructions Last Dose Given Next Dose Due   buPROPion 75 mg tablet  Commonly known as: Wellbutrin      Take 1 tablet (75 mg) by mouth once daily.       prenatal19-iron-folic-omega3 29-1-400 mg combo pack,tablet and capDR           sertraline 50 mg tablet  Commonly known as: Zoloft      Take 1 tablet (50 mg) by mouth once daily.              STOP taking these medications      metoclopramide 10 mg tablet  Commonly known as: Reglan        omeprazole OTC 20 mg EC tablet  Commonly known as: PriLOSEC OTC        ondansetron 4 mg tablet  Commonly known as: Zofran        Prenatal Plus DHA 27 mg iron-1 mg -312 mg-250 mg combo pack  Generic drug: prenatal72-iron fum-FA-om3-dha                  Complications Requiring Follow-Up      Test Results Pending At Discharge  Pending Labs       No current pending labs.            Outpatient Follow-Up  Future Appointments   Date Time Provider Department Center   2024 11:20 AM DAPHNEY Velasquez [id number] [place]   2024  2:20 PM DAPHNEY Velasquez [id number] [place]   10/8/2024  1:30 PM Idalmis Scales DO [id number] [facility]       I spent 30 minutes in the professional and overall care of this patient.      DAPHNEY David

## 2024-08-15 NOTE — DISCHARGE INSTRUCTIONS
Discharge instructions:     Call 911 or go to nearest emergency room right away if you have: PAIN or pressure in chest, OBSTRUCTED breathing or shortness of breath, SEIZURES, THOUGHTS of hurting yourself or your baby, heart palpitations/racing, change in alertness/confusion.    Pain Management:  -  Use acetaminophen (Tylenol)  as recommended on the bottle  -  Use ibuprofen (Motrin, Advil) as recommended on the bottle     Perineal Care:   -  If you have stiches, they should dissolve on their own within 6 weeks  -  Use kem/squirt bottle to rinse your perineal area with warm water after bowel movements and urination until vaginal bleeding ceases.  -  You may use Sitz baths for 10-15 minutes, 3-4x a day  -  Consider ice packs, as well as medicated pads and sprays for discomfort   -  It is important to stay hydrated, and to take stool softeners (docusate sodium/Colace) if needed, to keep your bowels soft while your bottom is healing. Milk of Magnesia and MiraLax (polyethylene glycol) may also be helpful.     Diet/supplementation:  -  Resume your pre-hospital diet  -  Drink plenty of water, especially if you are breastfeeding  -  Take your prenatal vitamin for as long as you are breastfeeding. Consider 5-6,000IU of vitamin D3 if you are breastfeeding to increase vitamin D levels in your breastmilk. This also may decrease incidence of “baby blues” and mood disturbance    Activity after discharge:  -  6 weeks pelvic rest. Please don’t have sex or put anything in your vaginal, including tampons or douching  -  Shower daily. If you have an incision, blow-dry on low, cool setting, or towel-dry the area  -  No lifting greater than 15lbs for 6 weeks  -  Walking and stairs as tolerated  -  Gradually increase your activity level until back to normal at approximately 6 weeks postpartum    Follow up appointment:  -  6 weeks  -  May have evaluation at 1-3 weeks for closer monitoring    Please call if:  -  You have feelings that you  want to harm yourself or others  -  You are having large-sized blood clots or soaking more than one large pad in an hour  -  You have vaginal discharge with a foul odor  -  You are having pain or burning with urination  -  Your temperature is greater than 100.4 F  -  Your pain is not controlled by the pain medication you are taking  -  You have trouble breathing at rest or when lying flat in bed  -  You have pain or tenderness in your calves  -  You are feeling weak, faint or dizzy  -  You experience worsening swelling to the feet or legs  -  You experience severe headaches and/or vision changes and/or pain in your upper abdomen   -  You experience severe chest pain  -  You have a painful, red area on your breast    Important phone numbers:  -  Answering service available 24 hours a day: [phone]  -  Midwife office: [phone]

## 2024-08-15 NOTE — CARE PLAN
The patient's goals for the shift include go home!    The clinical goals for the shift include VS to remain WNL    Over the shift, the patient met all goals      Problem: Discharge Planning  Goal: Discharge to home or other facility with appropriate resources  8/15/2024 1801 by Tawana Coleman RN  Outcome: Met  8/15/2024 0811 by Tawana Coleman RN  Flowsheets (Taken 2024 1731 by Memo Dow RN)  Discharge to home or other facility with appropriate resources:   Identify barriers to discharge with patient and caregiver   Arrange for needed discharge resources and transportation as appropriate     Problem: Postpartum  Goal: Experiences normal postpartum course  8/15/2024 1801 by Tawana Coleman RN  Outcome: Met  8/15/2024 0811 by Tawana Coleman RN  Flowsheets (Taken 8/15/2024 0811)  Experiences normal postpartum course:   Monitor maternal vital signs   Assess uterine involution   Med administration/monitoring of effect   Fundal and lochia asssessments w/fundal massage, bladder emptying   Assist with identification of coping methods and supprot resources  Goal: Appropriate maternal -  bonding  8/15/2024 1801 by Tawana Coleman RN  Outcome: Met  8/15/2024 0811 by Tawana Coleman RN  Flowsheets (Taken 8/15/2024 0811)  Appropriate maternal- bonding:   Identify family support   24-hour rooming in   Referral to  or  as needed   Assist with identification of coping methods and support resources   Encourage NICU visitation if infant is in NICU   Encourage Mom to interact with staff if visitation is not possible   Provide information about infant status if   Goal: Establish and maintain infant feeding pattern for adequate nutrition  8/15/2024 1801 by Tawana Coleman RN  Outcome: Met  8/15/2024 0811 by Tawana Coleman RN  Flowsheets (Taken 8/15/2024 0811)  Establish and maintain infant feeding pattern for adequate nutrition:   Assess  human milk feeding   Assess  formula feeding   Refer to lactation consultant as needed   Encourage feeding and/or pumping at least 8x/day  Goal: No s/sx infection  8/15/2024 1801 by Tawana Coleman RN  Outcome: Met  8/15/2024 0811 by Tawana Coleman RN  Flowsheets (Taken 8/15/2024 0811)  No s/sx of infection:   Hygiene practices/kem-care   Monitor QBL and vital signs   Fundal + lochia assessments w/fundal massage, bladder emptying, intrauterine device when indicated  Goal: No s/sx of hemorrhage  8/15/2024 1801 by Tawana Coleman RN  Outcome: Met  8/15/2024 0811 by Tawana Coleman RN  Flowsheets (Taken 8/15/2024 0811)  No s/sx of hemorrhage:   Monitor QBL and vital signs   Fundal + lochia assessments w/fundal massage, bladder emptying, intrauterine device when indicated  Goal: Minimal s/sx of HDP and BP<160/110  8/15/2024 1801 by Tawana Coleman RN  Outcome: Met  8/15/2024 0811 by Tawana Coleman RN  Flowsheets (Taken 8/15/2024 0811)  Minimal s/sx of HDP and BP <160/110:   Med administration/monitoring of effect   Monitor QBL and vital signs

## 2024-08-15 NOTE — NURSING NOTE
"Pt asking for formula-has been latching infant but he still is hungry-pt wants to supplement-given-pt up to void and do kem care-pt states \"it was a big pee, I didn't measure it\"-  "

## 2024-08-15 NOTE — CARE PLAN
The patient's goals for the shift include bond with baby    The clinical goals for the shift include stable vs    Over the shift, the patient did make progress.

## 2024-08-15 NOTE — PROGRESS NOTES
Spiritual Care Visit    Clinical Encounter Type  Visited With: Patient and family together  Routine Visit: Introduction  Continue Visiting: No                                            Taxonomy  Intended Effects: Preserve dignity and respect, Promote sense of peace, Meaning-making  Methods: Offer spiritual/Cheondoism support  Interventions: Provide a Cheondoism item(s), [place], Share words of hope and inspiration     congratulated the patient on her first baby.  Patient is active in her dejuan and at her Jehovah's witness.   gave her a baby Bible and prayed at her request.

## 2024-08-15 NOTE — PROGRESS NOTES
Postpartum Progress Note    Subjective   Pt doing well after delivery.  Moderate lochia - subjectively resolving. Ambulating and voiding without issue.  Some cramping - well controlled with motrin/tylenol.  Patient reports poor infant latch. Patient denies emotional concerns at this time.     Objective   Active Problems:    Depression affecting pregnancy ([facility])    Vaginal delivery ([facility])    Pregnancy Problems (from 01/22/24 to present)       Problem Noted Resolved    Vaginal delivery ([facility]) 8/14/2024 by DAPHNEY Espinal No    Priority:  Medium      Overview Signed 8/14/2024  5:19 PM by DAPHNEY Espinal     Intact,  mls         Supervision of other normal pregnancy, antepartum ([facility]) 1/24/2024 by DAPHNEY Velasquez No    Priority:  Medium      Overview Addendum 7/26/2024 12:01 PM by DAPHNEY David     Declines Covid/flu vaccine.  Normal rr NIPS, it's a BOY!    Hx of breast augmentation 2021 -- States that the implant in the left breast adhered to the muscle, and the provider was supposed to repair it, though then lost her medical license, so she has not had this repaired yet... Referral to breast surgery and specialty placed.     Rh NEG -- For Rhogam 28 weeks.     Wants 39 week IOL -- 8/14 @ 0800.  Epidural.         Depression affecting pregnancy ([facility]) 1/24/2024 by DAPHNEY Velasquez No    Priority:  Medium      Overview Addendum 7/3/2024  3:16 PM by DAPHNEY Velasquez     EPDS 15 on 1/24/24.  Referral placed to MAHI Deutsch -- Appt on 6/19.  Zoloft Rx 50 mg sent to pharmacy 2/22/24.    Reviewed visit with MAHI Deutsch -- Wellbutrin 75 mg; continue Zoloft 50 mg every AM.          Depressive disorder in mother affecting pregnancy ([facility]) 1/24/2024 by Wilfredo Reza, RN No    Priority:  Medium      Encounter for induction of labor ([facility]) 8/14/2024 by DAPHNEY Espinal 8/14/2024 by [facility]inal[facility]          Hospital  "course: no complications      Last Vitals:  /70 (BP Location: Right arm, Patient Position: Lying)   Pulse 95   Temp 36.7 °C (98.1 °F) (Oral)   Resp 20   Ht 1.6 m (5' 3\")   Wt 77.9 kg (171 lb 10.1 oz)   LMP 2023   SpO2 98%   Breastfeeding Yes   BMI 30.40 kg/m²     Physical Exam:  General: Examination reveals a well developed, well nourished, female, in no acute distress. She is alert and cooperative.  HEENT: PERRLA.  Lungs: Respirations unlabored  Fundus: firm and below umbilicus  Perineum: deferred  Extremities: no redness or tenderness in the calves or thighs, no edema  Psychological: awake and alert; oriented to person, place, and time    Lab Data:  Labs in chart were reviewed.  Lab Results   Component Value Date    LABRH NEG 2024     Lab Results   Component Value Date    RUBIG Positive 2024     Lab Results   Component Value Date    HGB 10.2 (L) 2024    HGB 11.3 (L) 2024       Assessment/Plan   Dotty Bojorquez is a 24 y.o., , who delivered at 39w1d gestation and is now postpartum day 1.  Continue routine postpartum care; pain well controlled on PO medications.  Breast and bottle feeding. Going well. Lactation consultant as needed.  Male infant.  Doing well.   Discharge today, assuming diad continue to meet milestones. Discharge instructions provided.   Follow up in 6 weeks for routine postpartum visit     Sadia White CNM      "

## 2024-08-15 NOTE — NURSING NOTE
"Pt refuses wellbutrin stating that she has the adverse reaction of headaches, and feeling \"out of it\".  "

## 2024-08-15 NOTE — ANESTHESIA POSTPROCEDURE EVALUATION
Patient: Dotty Bojorquez    Procedure Summary       Date: 08/14/24 Room / Location:     Anesthesia Start: 1338 Anesthesia Stop: 1702    Procedure: Labor Analgesia Diagnosis:     Scheduled Providers:  Responsible Provider: SOPHIA Nunez    Anesthesia Type: epidural ASA Status: 2            Anesthesia Type: epidural    Vitals Value Taken Time   /79 08/15/24 0635   Temp 36.4 08/15/24 0635   Pulse 90 08/15/24 0635   Resp 15 08/15/24 0635   SpO2 97 08/15/24 0635       Anesthesia Post Evaluation    Patient location during evaluation: bedside  Patient participation: complete - patient participated  Level of consciousness: awake and alert  Pain score: 0  Pain management: adequate  Airway patency: patent  Cardiovascular status: acceptable  Respiratory status: acceptable  Hydration status: acceptable  Postoperative Nausea and Vomiting: none  Comments: Epidural catheter removed by nursing. No redness, swelling, or drainage at puncture site.    Complete resolution of numbness. Patient is able to lift legs, bend at the knees, and ambulate.    Patient denies problems with urination.    Patient denies nausea, headache or severe back pain.         No notable events documented.

## 2024-08-16 LAB
BLOOD EXPIRATION DATE: NORMAL
BLOOD EXPIRATION DATE: NORMAL
DISPENSE STATUS: NORMAL
DISPENSE STATUS: NORMAL
PRODUCT BLOOD TYPE: 600
PRODUCT BLOOD TYPE: 600
PRODUCT CODE: NORMAL
PRODUCT CODE: NORMAL
UNIT ABO: NORMAL
UNIT ABO: NORMAL
UNIT NUMBER: NORMAL
UNIT NUMBER: NORMAL
UNIT RH: NORMAL
UNIT RH: NORMAL
UNIT VOLUME: 350
UNIT VOLUME: 350
XM INTEP: NORMAL
XM INTEP: NORMAL

## 2024-08-21 ENCOUNTER — APPOINTMENT (OUTPATIENT)
Dept: OBSTETRICS AND GYNECOLOGY | Facility: CLINIC | Age: 24
End: 2024-08-21
Payer: COMMERCIAL

## 2024-08-30 ENCOUNTER — POSTPARTUM VISIT (OUTPATIENT)
Dept: OBSTETRICS AND GYNECOLOGY | Facility: CLINIC | Age: 24
End: 2024-08-30
Payer: COMMERCIAL

## 2024-08-30 ENCOUNTER — APPOINTMENT (OUTPATIENT)
Dept: OBSTETRICS AND GYNECOLOGY | Facility: CLINIC | Age: 24
End: 2024-08-30
Payer: COMMERCIAL

## 2024-08-30 VITALS
DIASTOLIC BLOOD PRESSURE: 65 MMHG | SYSTOLIC BLOOD PRESSURE: 104 MMHG | BODY MASS INDEX: 26.79 KG/M2 | WEIGHT: 151.25 LBS

## 2024-08-30 DIAGNOSIS — N61.0 MASTITIS: Primary | ICD-10-CM

## 2024-08-30 RX ORDER — CEPHALEXIN 500 MG/1
500 CAPSULE ORAL 3 TIMES DAILY
Qty: 30 CAPSULE | Refills: 0 | Status: SHIPPED | OUTPATIENT
Start: 2024-08-30 | End: 2024-09-09

## 2024-08-30 ASSESSMENT — EDINBURGH POSTNATAL DEPRESSION SCALE (EPDS)
THE THOUGHT OF HARMING MYSELF HAS OCCURRED TO ME: NEVER
I HAVE BEEN ABLE TO LAUGH AND SEE THE FUNNY SIDE OF THINGS: NOT QUITE SO MUCH NOW
I HAVE BEEN SO UNHAPPY THAT I HAVE HAD DIFFICULTY SLEEPING: NOT VERY OFTEN
TOTAL SCORE: 15
I HAVE BLAMED MYSELF UNNECESSARILY WHEN THINGS WENT WRONG: YES, SOME OF THE TIME
I HAVE FELT SAD OR MISERABLE: YES, QUITE OFTEN
I HAVE LOOKED FORWARD WITH ENJOYMENT TO THINGS: DEFINITELY LESS THAN I USED TO
I HAVE BEEN SO UNHAPPY THAT I HAVE BEEN CRYING: ONLY OCCASIONALLY
THINGS HAVE BEEN GETTING ON TOP OF ME: YES, SOMETIMES I HAVEN'T BEEN COPING AS WELL AS USUAL
I HAVE FELT SCARED OR PANICKY FOR NO GOOD REASON: YES, SOMETIMES
I HAVE BEEN ANXIOUS OR WORRIED FOR NO GOOD REASON: YES, SOMETIMES

## 2024-08-30 NOTE — PROGRESS NOTES
Patient states that she has fatigue, chills, breast pain, breast burning, body aches, headaches, and lightheadedness.     GYNECOLOGY PROGRESS NOTE          CC:     Chief Complaint   Patient presents with    Postpartum Follow-up        HPI:  Dotty Bojorquez is here with new complaint of breast pain mainly on right, feeling warm, lightheaded.   Exclusively pumping but not getting up in middle of night to pump.  Had bilateral blisters on nipples that she popped.  Pumping 8 oz each breast each session.   8.14 CNM pt    Kenia Blackwell MD  Past Medical History:   Diagnosis Date    Abnormal Pap smear of cervix     Cellulitis of face 10/24/2023    Chlamydia     Encntr screen for infections w sexl mode of transmiss 08/15/2017    Encounter for induction of labor ([facility]) 2024    Excessive and frequent menstruation with regular cycle 2022    Spotting    H/O breast augmentation     Injury of finger of right hand 2024    Insomnia 2024    Personal history of other infectious and parasitic diseases 10/18/2019    History of viral infection    Seasonal allergies     Spotting 2024    Tension headache 2024    Urogenital trichomoniasis     Vaginitis 2024     Past Surgical History:   Procedure Laterality Date    OTHER SURGICAL HISTORY  2019    Tonsillectomy with adenoidectomy    HI BREAST AUGMENTATION WITH IMPLANT  Spring 2021     Social History     Tobacco Use    Smoking status: Never    Smokeless tobacco: Never   Vaping Use    Vaping status: Every Day    Substances: Nicotine   Substance Use Topics    Alcohol use: Not Currently    Drug use: Never     No family history on file.       ROS:  GYN - see HPI        PHYSICAL EXAM:  /65   Wt 68.6 kg (151 lb 4 oz)   LMP 2023   Breastfeeding Yes   BMI 26.79 kg/m²   GEN:  A&O, NAD  HEENT:  head HC/AT, no visible goiter  PSYCH:  normal affect, non-anxious  Breast - very full b/l, no masses, pie shaped area of  erythema on left slightly tender      IMPRESSION/PLAN:    Problem List Items Addressed This Visit    None  Visit Diagnoses       Mastitis    -  Primary    Relevant Medications    cephalexin (Keflex) 500 mg capsule            Discussed frequent pumping and or antibiotics.  Pt starting to feel systemic symptoms at would like to try antibiotics, may only need 5-7 d seems mild.  FU next week or sooner if not getting better.

## 2024-09-03 ENCOUNTER — APPOINTMENT (OUTPATIENT)
Dept: OBSTETRICS AND GYNECOLOGY | Facility: CLINIC | Age: 24
End: 2024-09-03
Payer: COMMERCIAL

## 2024-09-04 ENCOUNTER — APPOINTMENT (OUTPATIENT)
Dept: OBSTETRICS AND GYNECOLOGY | Facility: CLINIC | Age: 24
End: 2024-09-04
Payer: COMMERCIAL

## 2024-09-04 ENCOUNTER — POSTPARTUM VISIT (OUTPATIENT)
Dept: OBSTETRICS AND GYNECOLOGY | Facility: CLINIC | Age: 24
End: 2024-09-04
Payer: COMMERCIAL

## 2024-09-04 VITALS
DIASTOLIC BLOOD PRESSURE: 71 MMHG | HEIGHT: 63 IN | WEIGHT: 148.8 LBS | SYSTOLIC BLOOD PRESSURE: 108 MMHG | BODY MASS INDEX: 26.36 KG/M2

## 2024-09-04 DIAGNOSIS — Z91.89 BREASTFEEDING PROBLEM: Primary | ICD-10-CM

## 2024-09-04 PROCEDURE — 0503F POSTPARTUM CARE VISIT: CPT

## 2024-09-04 ASSESSMENT — EDINBURGH POSTNATAL DEPRESSION SCALE (EPDS)
TOTAL SCORE: 20
I HAVE LOOKED FORWARD WITH ENJOYMENT TO THINGS: HARDLY AT ALL
I HAVE BEEN SO UNHAPPY THAT I HAVE HAD DIFFICULTY SLEEPING: YES, SOMETIMES
THINGS HAVE BEEN GETTING ON TOP OF ME: YES, SOMETIMES I HAVEN'T BEEN COPING AS WELL AS USUAL
I HAVE BEEN ABLE TO LAUGH AND SEE THE FUNNY SIDE OF THINGS: NOT QUITE SO MUCH NOW
THE THOUGHT OF HARMING MYSELF HAS OCCURRED TO ME: NEVER
I HAVE BLAMED MYSELF UNNECESSARILY WHEN THINGS WENT WRONG: YES, SOME OF THE TIME
I HAVE FELT SCARED OR PANICKY FOR NO GOOD REASON: YES, QUITE A LOT
I HAVE BEEN ANXIOUS OR WORRIED FOR NO GOOD REASON: YES, VERY OFTEN
I HAVE BEEN SO UNHAPPY THAT I HAVE BEEN CRYING: YES, QUITE OFTEN
I HAVE FELT SAD OR MISERABLE: YES, QUITE OFTEN

## 2024-09-04 NOTE — PROGRESS NOTES
"Subjective   24 y.o.  presenting for postpartum follow-up.     Was seen in the office last week by Dr. Blackwell for mastitis.  Started antibiotic Rx on , currently on day 5 out of 10.  Mastitis symptoms have improved, but concerned that she may have another plugged duct on the right breast, states that it \"burns\" while she is pumping in the upper part of her breast.    She is currently exclusively pumping, though does have a desire to latch.  States that baby only latches well to the left side.    Pumps every 3 hours, getting out 8-13 ounces from EACH side with pumping.     Waking up during the night every 4 hours to pump.    Also states that her anxiety has been heightened lately.  Continues her Zoloft and Wellbutrin as previously prescribed by MAHI Deutsch.  No thoughts of self harm or harming others.  Does not have therapist, does not have follow-up with MAHI Deutsch currently scheduled.     Delivery Date: 2024  Type of Delivery: Vaginal, Spontaneous     Pregnancy Problems (from 24 to present)       Problem Noted Resolved    Supervision of other normal pregnancy, antepartum ([facility]) 2024 by DAPHNEY Velasquez No    Priority:  Medium      Overview Addendum 2024 12:01 PM by DAPHNEY David     Declines Covid/flu vaccine.  Normal rr NIPS, it's a BOY!    Hx of breast augmentation  -- States that the implant in the left breast adhered to the muscle, and the provider was supposed to repair it, though then lost her medical license, so she has not had this repaired yet... Referral to breast surgery and specialty placed.     Rh NEG -- For Rhogam 28 weeks.     Wants 39 week IOL --  @ 0800.  Epidural.         Depression affecting pregnancy ([facility]) 2024 by DAPHNEY Velasquez No    Priority:  Medium      Overview Addendum 7/3/2024  3:16 PM by DAPHNEY Velasquez     EPDS 15 on 24.  Referral placed to MAHI Deutsch -- Appt on .  Zoloft Rx 50 " "mg sent to pharmacy 24.    Reviewed visit with MAHI Deutsch -- Wellbutrin 75 mg; continue Zoloft 50 mg every AM.          Depressive disorder in mother affecting pregnancy ([facility]) 2024 by Wilfredo Reza, RN No    Priority:  Medium      Vaginal delivery ([facility]) 2024 by DAPHNEY Espinal No    Overview Signed 2024  5:19 PM by DAPHNEY Espinal     Intact,  mls         Encounter for induction of labor ([facility]) 2024 by DAPHNEY Espinal 2024 by DAPHNEY Espinal    Priority:  Medium            Mood:   Postpartum Depression: High Risk (2024)    [place]  Depression Scale     Last EPDS Total Score: 20     Last EPDS Self Harm Result: Never       Objective    /71 (BP Location: Right arm, Patient Position: Sitting, BP Cuff Size: Adult)   Ht 1.6 m (5' 3\")   Wt 67.5 kg (148 lb 12.8 oz)   LMP 2023   Breastfeeding Yes   BMI 26.36 kg/m²      Physical Exam  Constitutional:       Appearance: Normal appearance.   HENT:      Head: Normocephalic.   Cardiovascular:      Rate and Rhythm: Normal rate and regular rhythm.   Pulmonary:      Effort: Pulmonary effort is normal.   Abdominal:      Palpations: Abdomen is soft.   Neurological:      Mental Status: She is alert and oriented to person, place, and time.   Skin:     General: Skin is warm and dry.   Psychiatric:         Mood and Affect: Mood normal.      Breast: Engorged, very full bilaterally.  Small plugged duct on right side at 5 o'clock position.  Some nipple blistering bilaterally.      Plan    Discussed ways to relieve plugged duct -- Heat, massage, use of [employer]aa silicone breast pump with warm water and Epsom salt.   Reviewed importance of proper flange size for pumping; patient states that she has self-measured, has changed sizes a few times.  Referral to lactation provided.  Complete previously prescribed antibiotics.  Warning s/sx reviewed.     Referral to therapist " provided.  Patient to schedule PP follow-up with MAHI Deutsch as well.    Patient reports understanding, reports feeling much better after today's visit.    F/U for 6 week PPV or as needed.     OLIVIA Gallardo-KRISTENM

## 2024-09-16 ENCOUNTER — LACTATION CONSULT (OUTPATIENT)
Dept: LACTATION | Facility: CLINIC | Age: 24
End: 2024-09-16
Payer: COMMERCIAL

## 2024-09-16 DIAGNOSIS — O92.70 DISORDER OF LACTATION (HHS-HCC): Primary | ICD-10-CM

## 2024-09-16 PROCEDURE — 99211 OFF/OP EST MAY X REQ PHY/QHP: CPT

## 2024-09-16 ASSESSMENT — COLUMBIA-SUICIDE SEVERITY RATING SCALE - C-SSRS
1. IN THE PAST MONTH, HAVE YOU WISHED YOU WERE DEAD OR WISHED YOU COULD GO TO SLEEP AND NOT WAKE UP?: NO
6. HAVE YOU EVER DONE ANYTHING, STARTED TO DO ANYTHING, OR PREPARED TO DO ANYTHING TO END YOUR LIFE?: NO
2. HAVE YOU ACTUALLY HAD ANY THOUGHTS OF KILLING YOURSELF?: NO

## 2024-09-16 NOTE — PATIENT INSTRUCTIONS
Problems latching baby to breast: Baby should latch to areola (dark area) not just the nipple.  Baby's lips should be flanged outward.  Bring the baby up to the level of your breast by putting a pillow under the baby.  Do not use bottles or pacifiers until latching on well.  Dribble milk over the nipple or express milk so that baby can taste it  Encourage a deeper latch with the asymetrical latch- lining baby's nose opposite mother's nipple.  Encourage nipple to stand up prior to feeing by pumping, nipple rolling or by brief application of ice.  Gently tickle your baby's lip with your nipple to encourage your baby to open his/her mouth wide.  Hold baby so that your breast is positioned deep in the baby's mouth.  Hold your baby close, tummy to tummy.  If baby does not latch to breast, express breast milk.  If engorged, express some milk to soften breast.  If the baby is not latched on well, break seal and gently try again.  Keep baby skin to skin and watch for feeding cues.  Massage breast to start milk-ejection reflex.  Place nipple and at least 1 inch of areola into baby's mouth.  Place your hand behind the baby's neck and shoulder.  Do not force baby's head into breast.  Put baby in the football hold or clutch hold, supporting his/her neck and head in sniffing position to open his/her throat.  Shape breast into oval shape (sandwich hold)  for deep latch.  Try different feeding positions (cradle, football, side etc.).  Use a breast feeding pillow to bring baby up to the level of the breast.  Use semi-reclined feeding position to allow baby to take an active role and trigger baby's inborn feeding behavior.  Use your hand to support your breast during feeding.  When your baby's mouth is wide open, quickly pull baby into your breast.  Your baby's chin should be pressed into your breast.

## 2024-09-16 NOTE — PROGRESS NOTES
Lactation Counseling Note    Subjective:    Dotty Bojorquez is a 24 y.o. patient referred for lactation counseling. She is here today due to Latch issues. She was referred by her OB/GYN Ced Galdamez .     OB HISTORY:   Baby Name: Rahul  /Para: : 2  Para: 2  Weeks Gestation: 39  Mode of Delivery: Normal vaginal route  Induction/Augmentation  Epidural/General Anesthesia  Delivery Complications: None  Maternal Complications: Bp fell a few times  [place] Information: : 24  Place of Birth: [facility] Provider: Aretha  APGARS: Unknown by parent here today.  Skin to skin contact: First hour  Birth weight: 7 lb 12 oz  Birth length: 19.5 inches  Discharge weight: 7 lb 6 oz    Objective:    BREASTFEEDING ASSESSMENT:   Physical Exam    Breast Assessment: Large and Full  Nipple Assessment/Stage: intact and flat no pain  Feeding Position: breast sandwich, cross - cradle, both sides, and mother needs assistance with latch/positioning  Alternative Feeding Methods: paced bottle  Infant Feeding Method: Breast milk only  Infant Behavior: awake, readiness to feed, and content after feeding  Infant Information: Good cupping of tongue  Good lateral movement of the tongue  Able to elevate tongue to roof of mouth  Breast Pain: Pain scale 0-10 (10 most pain): 0  Nipple Pain: Pain scale 0-10 (10 most pain): 0  Weight: Weight before feedin gm  Weight after feeding  4648 grams  Took 66mls  PATIENT DISCUSSION SUMMARY:    LACTATION PROBLEMS AND STRATEGIES:  Problems latching baby to breast: Baby should latch to areola (dark area) not just the nipple.  Baby's lips should be flanged outward.  Bring the baby up to the level of your breast by putting a pillow under the baby.  Do not use bottles or pacifiers until latching on well.  Dribble milk over the nipple or express milk so that baby can taste it  Encourage a deeper latch with the asymetrical latch- lining baby's nose opposite mother's nipple.  Encourage  nipple to stand up prior to feeing by pumping, nipple rolling or by brief application of ice.  Gently tickle your baby's lip with your nipple to encourage your baby to open his/her mouth wide.  Hold baby so that your breast is positioned deep in the baby's mouth.  Hold your baby close, tummy to tummy.  If baby does not latch to breast, express breast milk.  If engorged, express some milk to soften breast.  If the baby is not latched on well, break seal and gently try again.  Keep baby skin to skin and watch for feeding cues.  Massage breast to start milk-ejection reflex.  Place nipple and at least 1 inch of areola into baby's mouth.  Place your hand behind the baby's neck and shoulder.  Do not force baby's head into breast.  Put baby in the football hold or clutch hold, supporting his/her neck and head in sniffing position to open his/her throat.  Shape breast into oval shape (sandwich hold)  for deep latch.  Try different feeding positions (cradle, football, side etc.).  Use a breast feeding pillow to bring baby up to the level of the breast.  Use nipple shield and monitor baby's weight gain and output.  Use semi-reclined feeding position to allow baby to take an active role and trigger baby's inborn feeding behavior.  Use your hand to support your breast during feeding.  When your baby's mouth is wide open, quickly pull baby into your breast.  Your baby's chin should be pressed into your breast.  PATIENT INSTRUCTION HANDOUTS GIVEN:   Support group flyer  LACTATION EDUCATION:  Waking Techniques, Correct Positioning, Correct Latch On, and Demo use of Pump

## 2024-09-18 ENCOUNTER — TELEPHONE (OUTPATIENT)
Dept: LACTATION | Facility: CLINIC | Age: 24
End: 2024-09-18
Payer: COMMERCIAL

## 2024-09-19 NOTE — PROGRESS NOTES
Subjective   24 y.o.  presenting for postpartum follow-up.    Delivery Date: 2024  Type of Delivery: Vaginal, Spontaneous     Pregnancy Problems (from 24 to present)       Problem Noted Resolved    Vaginal delivery ([facility]) 2024 by DAPHNEY Espinal No    Priority:  Medium      Overview Signed 2024  5:19 PM by DAPHNEY Espinal     Intact,  mls         Supervision of other normal pregnancy, antepartum ([facility]) 2024 by DAPHNEY Velasquez No    Priority:  Medium      Overview Addendum 2024 12:01 PM by DAPHNEY David     Declines Covid/flu vaccine.  Normal rr NIPS, it's a BOY!    Hx of breast augmentation  -- States that the implant in the left breast adhered to the muscle, and the provider was supposed to repair it, though then lost her medical license, so she has not had this repaired yet... Referral to breast surgery and specialty placed.     Rh NEG -- For Rhogam 28 weeks.     Wants 39 week IOL --  @ 0800.  Epidural.         Depression affecting pregnancy ([facility]) 2024 by DAPHNEY Velasquez No    Priority:  Medium      Overview Addendum 7/3/2024  3:16 PM by DAPHNEY Velasquez     EPDS 15 on 24.  Referral placed to MAHI Deutsch -- Appt on .  Zoloft Rx 50 mg sent to pharmacy 24.    Reviewed visit with MAHI Deutsch -- Wellbutrin 75 mg; continue Zoloft 50 mg every AM.          Depressive disorder in mother affecting pregnancy ([facility]) 2024 by Wilfredo Reza, RN No    Priority:  Medium      Encounter for induction of labor ([facility]) 2024 by DAPHNEY Espinal 2024 by DAPHNEY Espinal    Priority:  Medium              Concerns: None.    Pain: controlled  Lacerations: none  Lochia: Resolved, having yellow discharge.  Sexual Intimacy: Yes, with some discomfort throughout per patient.  Contraceptive Method: POP  Feeding Method: She is breast feeding exclusively. no  "breast or nursing problems  Lactation Consult Needed?: No    Oversupply, donating breast milk.     Birth Trauma: No  Bonding with Baby: well with baby boy  Mood:   Postpartum Depression: High Risk (2024)    [place]  Depression Scale     Last EPDS Total Score: 16     Last EPDS Self Harm Result: Never     \"I'm feeling OK, definitely better than I was.\"  Looking for therapist, inquired through [email or web address].  Has follow-up with MAHI Deutsch.    Last pap: 3/2022, LSIL.     Objective    /77   Wt 64.7 kg (142 lb 9.6 oz)   LMP 2023   Breastfeeding Yes   BMI 25.26 kg/m²      Physical Exam  Constitutional:       Appearance: Normal appearance.   Genitourinary:      Vulva normal.      No vaginal discharge or bleeding.      No cervical lesion.   HENT:      Head: Normocephalic.   Cardiovascular:      Rate and Rhythm: Normal rate and regular rhythm.   Pulmonary:      Effort: Pulmonary effort is normal.   Abdominal:      Palpations: Abdomen is soft.   Neurological:      Mental Status: She is alert and oriented to person, place, and time.   Skin:     General: Skin is warm and dry.   Psychiatric:         Mood and Affect: Mood normal.          Plan    Advised to call office for breast complaints, abnormal bleeding, mood changes, or other concerning symptoms.   Cleared to resume normal activity as desired.  Continue daily PNV for at least one year PP, or for longer if breastfeeding for longer than that.  Needs refill on medication until able to get in for appointment with MAHI Deutsch.     Pap done today; if normal, will repeat in 1 year.     Use of artificial lubricant during intercourse; pelvic floor PT referral placed, patient to schedule.     Contraception -- Rx. For POPs provided:   Discussed oral contraceptive use including the lack of protection from STDs, and the risks/benefits and alternatives to this therapy.  Patient informed that it is essential that the pill be taken at the same time each day to " maximize contraceptive efficacy. Patient aware that if she misses a dose by more than three hours, she must use additional contraception (condoms) for 48 hours after the late dose. Patient aware that POPs are taken continuously with no hormone-free days.  May start taking pill today, and should use abstinence/condoms/withdrawal for the first 3-5 days of use.  Patient aware of all instructions and consents to starting this method. Patient encouraged to read information packet and to be compliant with daily use.     F/U as needed.     DAPHNEY Gallardo

## 2024-09-25 ENCOUNTER — APPOINTMENT (OUTPATIENT)
Dept: OBSTETRICS AND GYNECOLOGY | Facility: CLINIC | Age: 24
End: 2024-09-25
Payer: COMMERCIAL

## 2024-09-25 VITALS — DIASTOLIC BLOOD PRESSURE: 77 MMHG | SYSTOLIC BLOOD PRESSURE: 112 MMHG | WEIGHT: 142.6 LBS | BODY MASS INDEX: 25.26 KG/M2

## 2024-09-25 DIAGNOSIS — F32.A DEPRESSION AFFECTING PREGNANCY (HHS-HCC): ICD-10-CM

## 2024-09-25 DIAGNOSIS — O99.340 DEPRESSION AFFECTING PREGNANCY (HHS-HCC): ICD-10-CM

## 2024-09-25 DIAGNOSIS — N93.9 ABNORMAL UTERINE BLEEDING (AUB): ICD-10-CM

## 2024-09-25 DIAGNOSIS — R87.612 LGSIL OF CERVIX OF UNDETERMINED SIGNIFICANCE: ICD-10-CM

## 2024-09-25 DIAGNOSIS — Z30.011 ENCOUNTER FOR INITIAL PRESCRIPTION OF CONTRACEPTIVE PILLS: ICD-10-CM

## 2024-09-25 DIAGNOSIS — Z12.4 SCREENING FOR CERVICAL CANCER: ICD-10-CM

## 2024-09-25 LAB — PREGNANCY TEST URINE, POC: NEGATIVE

## 2024-09-25 PROCEDURE — 87624 HPV HI-RISK TYP POOLED RSLT: CPT

## 2024-09-25 RX ORDER — BUPROPION HYDROCHLORIDE 75 MG/1
75 TABLET ORAL DAILY
Qty: 90 TABLET | Refills: 0 | Status: SHIPPED | OUTPATIENT
Start: 2024-09-25 | End: 2025-09-25

## 2024-09-25 RX ORDER — NORETHINDRONE 0.35 MG/1
1 TABLET ORAL DAILY
Qty: 84 TABLET | Refills: 3 | Status: SHIPPED | OUTPATIENT
Start: 2024-09-25 | End: 2025-09-25

## 2024-09-25 RX ORDER — SERTRALINE HYDROCHLORIDE 50 MG/1
50 TABLET, FILM COATED ORAL DAILY
Qty: 60 TABLET | Refills: 1 | Status: SHIPPED | OUTPATIENT
Start: 2024-09-25 | End: 2025-09-25

## 2024-09-25 ASSESSMENT — EDINBURGH POSTNATAL DEPRESSION SCALE (EPDS)
I HAVE BEEN SO UNHAPPY THAT I HAVE BEEN CRYING: ONLY OCCASIONALLY
I HAVE BEEN ABLE TO LAUGH AND SEE THE FUNNY SIDE OF THINGS: NOT QUITE SO MUCH NOW
I HAVE BEEN SO UNHAPPY THAT I HAVE HAD DIFFICULTY SLEEPING: NOT VERY OFTEN
I HAVE BEEN ANXIOUS OR WORRIED FOR NO GOOD REASON: YES, VERY OFTEN
I HAVE FELT SAD OR MISERABLE: YES, QUITE OFTEN
THINGS HAVE BEEN GETTING ON TOP OF ME: YES, SOMETIMES I HAVEN'T BEEN COPING AS WELL AS USUAL
THE THOUGHT OF HARMING MYSELF HAS OCCURRED TO ME: NEVER
I HAVE BLAMED MYSELF UNNECESSARILY WHEN THINGS WENT WRONG: NOT VERY OFTEN
TOTAL SCORE: 16
I HAVE LOOKED FORWARD WITH ENJOYMENT TO THINGS: DEFINITELY LESS THAN I USED TO
I HAVE FELT SCARED OR PANICKY FOR NO GOOD REASON: YES, QUITE A LOT

## 2024-10-04 LAB
CYTOLOGY CMNT CVX/VAG CYTO-IMP: NORMAL
HPV HR 12 DNA GENITAL QL NAA+PROBE: POSITIVE
HPV HR GENOTYPES PNL CVX NAA+PROBE: POSITIVE
HPV16 DNA SPEC QL NAA+PROBE: NEGATIVE
HPV18 DNA SPEC QL NAA+PROBE: NEGATIVE
LAB AP HPV GENOTYPE QUESTION: YES
LAB AP HPV HR: NORMAL
LAB AP PREVIOUS ABNORMAL HISTORY: NORMAL
LABORATORY COMMENT REPORT: NORMAL
PATH REPORT.TOTAL CANCER: NORMAL
RESIDENT REVIEW: NORMAL

## 2024-10-08 ENCOUNTER — APPOINTMENT (OUTPATIENT)
Dept: DERMATOLOGY | Facility: CLINIC | Age: 24
End: 2024-10-08
Payer: COMMERCIAL

## 2024-10-08 DIAGNOSIS — R20.8 SKIN PAIN: ICD-10-CM

## 2024-10-08 DIAGNOSIS — D23.9 DERMATOFIBROMA: ICD-10-CM

## 2024-10-08 DIAGNOSIS — L72.0 EPITHELIAL INCLUSION CYST: ICD-10-CM

## 2024-10-08 DIAGNOSIS — D48.5 NEOPLASM OF UNCERTAIN BEHAVIOR OF SKIN: Primary | ICD-10-CM

## 2024-10-08 PROCEDURE — 11104 PUNCH BX SKIN SINGLE LESION: CPT | Performed by: DERMATOLOGY

## 2024-10-08 NOTE — PROGRESS NOTES
Subjective     Dotty Bojorquez is a 24 y.o. female who presents for the following: Procedure (Lesion on the right neck and the left arm that are irritating and would like remove. Deferred previously at visit due to pregnancy. She is breast feeding.).     Review of Systems:  No other skin or systemic complaints other than what is documented elsewhere in the note.    The following portions of the chart were reviewed this encounter and updated as appropriate:          Skin Cancer History  No skin cancer on file.      Specialty Problems          Dermatology Problems    Dermatofibroma    Keratosis pilaris        Objective   Well appearing patient in no apparent distress; mood and affect are within normal limits.    A focused skin examination was performed. All findings within normal limits unless otherwise noted below.    Assessment/Plan   1. Neoplasm of uncertain behavior of skin (2)  Right Posterior Neck  0.5cm mobile nodule          Staff Communication: Dermatology Local Anesthesia: 1 % Plain Lidocaine - Amount:3cc    Left Upper Arm - Posterior  0.5cm mobile nodule          Lesion biopsy  Type of biopsy: punch    Informed consent: discussed and consent obtained    Timeout: patient name, date of birth, surgical site, and procedure verified    Procedure prep:  Patient was prepped and draped  Anesthesia: the lesion was anesthetized in a standard fashion    Anesthetic:  1% lidocaine w/ epinephrine 1-100,000 local infiltration  Punch size:  5 mm  Suture size:  5-0  Suture type: fast-absorbing plain gut    Hemostasis achieved with: suture    Outcome: patient tolerated procedure well    Post-procedure details: sterile dressing applied and wound care instructions given    Dressing type: bandage and petrolatum      Staff Communication: Dermatology Local Anesthesia: 1 % Plain Lidocaine - Amount:3cc    Specimen 2 - Dermatopathology- DERM LAB  Differential Diagnosis: dermatofibroma  Check Margins Yes/No?:    Comments:     Dermpath Lab: Routine Histopathology (formalin-fixed tissue)    Due to symptomatic nature of this skin lesion offered punch removal  The lesion will be traded for a scar and sent for pathology.  The risks include incomplete removal, bleeding, infection and recurrence of the lesion after removal.    Patient declined removal of lesion on the right posterior neck today.

## 2024-10-15 LAB
LAB AP ASR DISCLAIMER: NORMAL
LABORATORY COMMENT REPORT: NORMAL
PATH REPORT.FINAL DX SPEC: NORMAL
PATH REPORT.GROSS SPEC: NORMAL
PATH REPORT.MICROSCOPIC SPEC OTHER STN: NORMAL
PATH REPORT.RELEVANT HX SPEC: NORMAL
PATH REPORT.TOTAL CANCER: NORMAL

## 2024-11-04 ENCOUNTER — APPOINTMENT (OUTPATIENT)
Dept: OBSTETRICS AND GYNECOLOGY | Facility: CLINIC | Age: 24
End: 2024-11-04
Payer: COMMERCIAL

## 2024-11-04 VITALS
DIASTOLIC BLOOD PRESSURE: 72 MMHG | WEIGHT: 141.25 LBS | BODY MASS INDEX: 25.02 KG/M2 | SYSTOLIC BLOOD PRESSURE: 114 MMHG

## 2024-11-04 DIAGNOSIS — N93.9 ABNORMAL UTERINE BLEEDING (AUB): ICD-10-CM

## 2024-11-04 DIAGNOSIS — R87.612 LGSIL ON PAP SMEAR OF CERVIX: ICD-10-CM

## 2024-11-04 LAB — PREGNANCY TEST URINE, POC: NEGATIVE

## 2024-11-04 PROCEDURE — 88305 TISSUE EXAM BY PATHOLOGIST: CPT | Performed by: PATHOLOGY

## 2024-11-04 PROCEDURE — 81025 URINE PREGNANCY TEST: CPT | Performed by: ADVANCED PRACTICE MIDWIFE

## 2024-11-04 PROCEDURE — 57454 BX/CURETT OF CERVIX W/SCOPE: CPT | Performed by: ADVANCED PRACTICE MIDWIFE

## 2024-11-04 PROCEDURE — 58110 BX DONE W/COLPOSCOPY ADD-ON: CPT | Performed by: ADVANCED PRACTICE MIDWIFE

## 2024-11-04 PROCEDURE — 88305 TISSUE EXAM BY PATHOLOGIST: CPT
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
## 2024-11-04 NOTE — PROGRESS NOTES
Patient ID: Dotty Bojorquez is a 24 y.o. female.    Colposcopy    Date/Time: 11/4/2024 2:40 PM    Performed by: DAPHNEY Espinal  Authorized by: DAPHNEY Espinal    Procedure location: cervix and vagina    Consent:     Patient questions answered: yes      Risks and benefits of the procedure and its alternatives discussed: yes      Procedural risks discussed:  Bleeding and infection    Consent obtained:  Verbal and written    Consent given by:  Patient  Indication:     Cervical indication(s): high-risk HPV positive, ASC-H and AGC    Pre-procedure:     Prep solution(s): acetic acid      Local anesthetic:  Benzocaine spray  Procedure:     Colposcopy with: cervical biopsy and endocervical curettage      Biopsy taken: yes      # of biopsies:  2    Biopsy location(s): 1 & 7 o'clock    Cervix visibility: fully visualized      SCJ visibility: fully visualized      Lesion visualized: fully visualized      Acetowhite lesion(s): cervix      Cervical impression: low grade      Vaginal impression: normal/benign      Ferric subsulfate solution applied: yes      Tampon inserted: no    Post-procedure:     Patient tolerance of procedure:  Patient tolerated the procedure well with no immediate complications    Instructions and paperwork completed: yes      Educational handouts given: yes

## 2024-11-06 ENCOUNTER — TELEPHONE (OUTPATIENT)
Dept: OBSTETRICS AND GYNECOLOGY | Facility: CLINIC | Age: 24
End: 2024-11-06
Payer: COMMERCIAL

## 2024-11-08 ENCOUNTER — APPOINTMENT (OUTPATIENT)
Dept: DERMATOLOGY | Facility: CLINIC | Age: 24
End: 2024-11-08
Payer: COMMERCIAL

## 2024-11-11 LAB
LABORATORY COMMENT REPORT: NORMAL
LABORATORY COMMENT REPORT: NORMAL
PATH REPORT.FINAL DX SPEC: NORMAL
PATH REPORT.FINAL DX SPEC: NORMAL
PATH REPORT.GROSS SPEC: NORMAL
PATH REPORT.GROSS SPEC: NORMAL
PATH REPORT.RELEVANT HX SPEC: NORMAL
PATH REPORT.RELEVANT HX SPEC: NORMAL
PATH REPORT.TOTAL CANCER: NORMAL
PATH REPORT.TOTAL CANCER: NORMAL

## 2024-11-13 ENCOUNTER — APPOINTMENT (OUTPATIENT)
Dept: BEHAVIORAL HEALTH | Facility: CLINIC | Age: 24
End: 2024-11-13
Payer: COMMERCIAL

## 2024-11-13 DIAGNOSIS — F32.A DEPRESSION AFFECTING PREGNANCY (HHS-HCC): ICD-10-CM

## 2024-11-13 DIAGNOSIS — O99.340 DEPRESSION AFFECTING PREGNANCY (HHS-HCC): ICD-10-CM

## 2024-11-13 DIAGNOSIS — F41.9 ANXIETY: ICD-10-CM

## 2024-11-13 PROCEDURE — 99214 OFFICE O/P EST MOD 30 MIN: CPT | Performed by: CLINICAL NURSE SPECIALIST

## 2024-11-13 RX ORDER — BUPROPION HYDROCHLORIDE 75 MG/1
75 TABLET ORAL 2 TIMES DAILY
Qty: 180 TABLET | Refills: 0 | Status: SHIPPED | OUTPATIENT
Start: 2024-11-13 | End: 2025-11-13

## 2024-11-13 RX ORDER — SERTRALINE HYDROCHLORIDE 100 MG/1
100 TABLET, FILM COATED ORAL DAILY
Qty: 90 TABLET | Refills: 0 | Status: SHIPPED | OUTPATIENT
Start: 2024-11-13 | End: 2025-11-13

## 2024-11-13 NOTE — PROGRESS NOTES
Subjective   Patient ID: Dotty Bojorquez is a 24 y.o. female who presents for follow up management of MDD peripartum    HPI  Review of Systems   All other systems reviewed and are negative.     Psych Review of Symptoms  Objective   Physical Exam  Psychiatric:         Attention and Perception: Attention and perception normal.         Mood and Affect: Mood is anxious and depressed.         Speech: Speech normal.         Behavior: Behavior normal. Behavior is cooperative.         Thought Content: Thought content normal.         Cognition and Memory: Cognition and memory normal.         Judgment: Judgment normal.     Assessment/Plan   IMP: 24 SF4P2 PMH  Presents for unplanned pregnancy and delivered on 2024. FOB # 2 is her now . . Started Bupropion 150mg XL but finding SES of increased anxiety and headaches intolerable, so was switched to Wellbutrin SR 75 mg every day  Contacted [facility] for resolution of past IAB and wait listed.      Now at 3 month pp. for baby boy, Rahul. Took awhile for attachment to baby. Denies medical complications, birth trauma, baby doing well. Waiting for services at [facility] for resolution of prior losses, some thoughts of prior pregnancies, ie 'what could have been', coupled w/ gratitude for baby she now has.  Pumping. ANXIETY: Noted increased anxiety, when goes out both with and without baby, ie high, anxiety, feeling flushed, fears crowds, germs. Self rates anxiety as 7-8/10, avoids going out, and stating home most of time. Taking Sertraline 50 mg po qam , denies SE. MOOD: Feeling sad, depressed, crying 2-3 times per week, low motivation, denies SI/HI.        Reviewed r/b/a for increasing Wellbutrin to 75 mg BID and Sertraline to 100mg r/t medication exposure to infant. Discussed referral   IOP ut declines. Recommended ind therapy and pt more accepting. Education provided treatment for depression is multifaceted ie medication, psychotherapy  and lifestyle changes.. Reviewed plan for self care, encouraged to avoid staying at home. Pt agrees to work on this            Diagnoses   MDD Recurrent Peripartum Onset  Three month pp   Past unresolved IAB   Hx of PMAD      Treatment   Increase Wellbutrin 75 mg po qam from 1 tab to BID ordered       Increase  taking Sertraline 50 gm po qam from 1 tab for 1.5 tabs for 5-7 days and then increase to 100mg (2) tabs of 50 mg   Begin Sertraline 100mg po qam take 1 tab po qam ordered     RTC in 3 -4 weeks    Wait listed for [facility] for treatment of pregnancy loss   Contact me via [employer] as needed    Encourage to get out 3 times per week     Refer for ind therapy w/  [facility]   - contact given for [facility] for ind therapy for treatment of PMAD [phone]  - referred to  Adult Psychology- completed

## 2024-12-04 ENCOUNTER — TELEPHONE (OUTPATIENT)
Dept: OBSTETRICS AND GYNECOLOGY | Facility: CLINIC | Age: 24
End: 2024-12-04
Payer: COMMERCIAL

## 2024-12-11 ENCOUNTER — APPOINTMENT (OUTPATIENT)
Dept: BEHAVIORAL HEALTH | Facility: CLINIC | Age: 24
End: 2024-12-11
Payer: COMMERCIAL

## 2025-04-01 ENCOUNTER — APPOINTMENT (OUTPATIENT)
Dept: OBSTETRICS AND GYNECOLOGY | Facility: CLINIC | Age: 25
End: 2025-04-01
Payer: COMMERCIAL

## 2025-04-01 VITALS — DIASTOLIC BLOOD PRESSURE: 76 MMHG | SYSTOLIC BLOOD PRESSURE: 113 MMHG | BODY MASS INDEX: 24.84 KG/M2 | WEIGHT: 140.2 LBS

## 2025-04-01 DIAGNOSIS — Z12.4 CERVICAL CANCER SCREENING: ICD-10-CM

## 2025-04-01 DIAGNOSIS — Z3A.01 LESS THAN 8 WEEKS GESTATION OF PREGNANCY (HHS-HCC): Primary | ICD-10-CM

## 2025-04-01 DIAGNOSIS — O99.340 DEPRESSION AFFECTING PREGNANCY (HHS-HCC): ICD-10-CM

## 2025-04-01 DIAGNOSIS — F32.A DEPRESSION AFFECTING PREGNANCY (HHS-HCC): ICD-10-CM

## 2025-04-01 DIAGNOSIS — O21.9 NAUSEA AND VOMITING IN PREGNANCY PRIOR TO 22 WEEKS GESTATION: ICD-10-CM

## 2025-04-01 LAB — PREGNANCY TEST URINE, POC: POSITIVE

## 2025-04-01 PROCEDURE — 81025 URINE PREGNANCY TEST: CPT | Performed by: ADVANCED PRACTICE MIDWIFE

## 2025-04-01 PROCEDURE — 0500F INITIAL PRENATAL CARE VISIT: CPT | Performed by: ADVANCED PRACTICE MIDWIFE

## 2025-04-01 RX ORDER — METOCLOPRAMIDE 10 MG/1
10 TABLET ORAL 4 TIMES DAILY
Qty: 120 TABLET | Refills: 1 | Status: SHIPPED | OUTPATIENT
Start: 2025-04-01 | End: 2025-05-31

## 2025-04-01 ASSESSMENT — EDINBURGH POSTNATAL DEPRESSION SCALE (EPDS)
I HAVE FELT SCARED OR PANICKY FOR NO GOOD REASON: NO, NOT MUCH
I HAVE LOOKED FORWARD WITH ENJOYMENT TO THINGS: AS MUCH AS I EVER DID
I HAVE BEEN ABLE TO LAUGH AND SEE THE FUNNY SIDE OF THINGS: AS MUCH AS I ALWAYS COULD
TOTAL SCORE: 3
I HAVE BEEN SO UNHAPPY THAT I HAVE HAD DIFFICULTY SLEEPING: NOT AT ALL
I HAVE BLAMED MYSELF UNNECESSARILY WHEN THINGS WENT WRONG: NO, NEVER
THINGS HAVE BEEN GETTING ON TOP OF ME: NO, I HAVE BEEN COPING AS WELL AS EVER
I HAVE FELT SAD OR MISERABLE: NO, NOT AT ALL
I HAVE BEEN SO UNHAPPY THAT I HAVE BEEN CRYING: NO, NEVER
I HAVE BEEN ANXIOUS OR WORRIED FOR NO GOOD REASON: YES, SOMETIMES
THE THOUGHT OF HARMING MYSELF HAS OCCURRED TO ME: NEVER

## 2025-04-01 ASSESSMENT — ENCOUNTER SYMPTOMS
APPETITE CHANGE: 1
FATIGUE: 1
NAUSEA: 1

## 2025-04-01 NOTE — PROGRESS NOTES
Initial Ob Visit  25    Liudmila Diego is a 25 y.o.  at 7w4d with a working estimated date of delivery of 2025, by Last Menstrual Period who presents for an initial prenatal visit. This pregnancy is planned. Only had 1 period then conceived. TVUS today measuring 6w2d, 9 day difference, will send for dating U/S. Still breastfeeding her 7 month old, had noticed a drop in her supply.     Patient currently experiencing: nausea, desires anti-emetics  Bleeding or cramping since LMP: no  Taking prenatal vitamin: Yes  Ultrasound completed this pregnancy: No  Vaccinated for flu?: No, declines  Up to date on Covid booster?: No, declines    Last pap:     OB History    Para Term  AB Living   5 2 2 0 2 2   SAB IAB Ectopic Multiple Live Births   1 1 0   2      # Outcome Date GA Lbr Jeff/2nd Weight Sex Type Anes PTL Lv   5 Current            4 Term 25 39w1d  3.52 kg M Vag-Spont EPI  FLIP   3 Term 18 39w0d   F Vag-Spont None  FLIP   2 IAB      Medical Radha None     1 SAB       None       [place]  Depression Scale Total: 3  Prior pregnancy complications: diabetes    History of hypertension:  No    Past Medical History:   Diagnosis Date    Abnormal Pap smear of cervix     Adenomyosis of uterus 2024    Cellulitis of face 10/24/2023    Chlamydia     Encntr screen for infections w sexl mode of transmiss 08/15/2017    Encounter for induction of labor 2024    Excessive and frequent menstruation with regular cycle 2022    Spotting    H/O breast augmentation     Injury of finger of right hand 2024    Insomnia 2024    Personal history of other infectious and parasitic diseases 10/18/2019    History of viral infection    Reduced libido 2024    Seasonal allergies     Spotting 2024    Tension headache 2024    Urogenital trichomoniasis     Vaginal delivery ([facility]) 2024    Intact,  mls      Vaginitis 2024       Past Surgical History:   Procedure Laterality Date    OTHER SURGICAL HISTORY  2019    Tonsillectomy with adenoidectomy    MT BREAST AUGMENTATION WITH IMPLANT  Spring 2021        Objective   Visit Vitals  /76   Wt 63.6 kg (140 lb 3.2 oz)   LMP 2025 (Exact Date)   BMI 24.84 kg/m²   OB Status Pregnant   Smoking Status Never   BSA 1.68 m²     Physical Exam  Weight: 63.6 kg (140 lb 3.2 oz)  TW.627 kg (10 lb 3.2 oz)   Expected Total Weight Gain: 11.5 kg (25 lb)-16 kg (35 lb)   Pregravid BMI: 23.03  BP: 113/76    Review of Systems   Constitutional:  Positive for appetite change and fatigue.   Gastrointestinal:  Positive for nausea.   All other systems reviewed and are negative.       Postpartum Depression: Low Risk  (2025)    [place]  Depression Scale     Last EPDS Total Score: 3     Last EPDS Self Harm Result: Never          Assessment   25 y.o.  at 7w4d     Problem List Items Addressed This Visit          Ob-Gyn Problems    Nausea and vomiting in pregnancy prior to 22 weeks gestation    Overview     Reglan PRN         Less than 8 weeks gestation of pregnancy ([facility]) - Primary    Overview     Desired provider in labor: [x] CNM  [] Physician   [] Either Acceptable  [x] Blood Products: [x] Yes, accepts [] No, needs counseling  [x] Initial BMI: 23.03   [] Prenatal Labs:   [x] Cervical Cancer Screening up to date: due 2025 - plan to collected at 6 wk PPV  [x] Rh status: Negative  [] Genetic Screening (cfDNA):  Plans NIPTs  [] First Trimester Anatomy Screen (11-13.6 wks):  [] Baby ASA (initiated): Start at 12 weeks  [] Pregnancy dated by:     [] Anatomy US: (19-20 wks)  [] Federal Sterilization consent signed (if indicated):  [] 1hr GCT at 24-28wks:  [] Rhogam:  [] Fetal Surveillance (if indicated):  [] Tdap (27-32 wks, may be given up to 36 wks if initial window missed):   [] RSV (32-36 wks) (Sept. to end of ):     [x] Feeding Intentions: Breast  [] Postpartum Birth  control method:   [] GBS at 36 - 37 wks:  [] 39 weeks discussion of IOL vs. Expectant management:  [x] Mode of delivery ( anticipated ): vaginal           Relevant Medications    metoclopramide (Reglan) 10 mg tablet    Other Relevant Orders    Urine Culture    US MAC OB imaging order    POCT pregnancy, urine manually resulted (Completed)    C. trachomatis / N. gonorrhoeae, Amplified, Urogenital    Trichomonas vaginalis, Amplified    C. trachomatis / N. gonorrhoeae, Amplified, Urogenital    Trichomonas vaginalis, Amplified       Other    Depression affecting pregnancy ([facility])    Overview     EPDS: 3 at NOB visit  150 mg Zoloft daily          Other Visit Diagnoses       Cervical cancer screening                Plan   - New OB resources provided and reviewed with particular attention to dietary, travel, and medication restrictions  - Oriented to practice, CNM vs. MD care, desires CNM care  - Reviewed IOM recommendations for weight gain given pt's BMI: 25-35 pounds (BMI 18.5 - 24.9)  - Reviewed bleeding precautions, warning signs, when to call provider; phone number provided  - Discussed bASA for PEC prophylaxis, will consider  - The following Rx were sent to pharmacy: Reglan  - Routine NOB labs to be ordered at next visit  - Additional labs added: Hgb A1C  - NIPT discussed with patient: patient desires. Pre-test genetic counseling discussed and included:   Interpretation of family and medical histories to assess the probability of disease occurrence or recurrence;   Education about inheritance, genetic testing, disease management, prevention and resources  Counseling to promote informed choices and adaptation to the risk or presented of a genetic condition  Counseling for psychological aspects of genetic testing.  - Dating ultrasound ordered, pt to schedule  - Return in 6 weeks for routine prenatal care  -  Discussed compromised immune system in pregnancy and increased risk of marc illness, especially URIs  like Flu, RSV, & Covid. Explained greater risk of complications associated with illnesses and higher incidence of hospitalization. Pt declines Flu and Covid vaccines.         OLIVIA Espinal-MONI

## 2025-04-02 ENCOUNTER — APPOINTMENT (OUTPATIENT)
Dept: OBSTETRICS AND GYNECOLOGY | Facility: CLINIC | Age: 25
End: 2025-04-02
Payer: COMMERCIAL

## 2025-04-02 LAB
C TRACH RRNA SPEC QL NAA+PROBE: NOT DETECTED
N GONORRHOEA RRNA SPEC QL NAA+PROBE: NOT DETECTED
QUEST GC CT AMPLIFIED (ALWAYS MESSAGE): NORMAL
T VAGINALIS RRNA SPEC QL NAA+PROBE: NOT DETECTED

## 2025-04-04 LAB
BACTERIA UR CULT: ABNORMAL
C TRACH RRNA SPEC QL NAA+PROBE: NORMAL
T VAGINALIS RRNA SPEC QL NAA+PROBE: NORMAL